# Patient Record
Sex: MALE | Race: WHITE | Employment: OTHER | URBAN - METROPOLITAN AREA
[De-identification: names, ages, dates, MRNs, and addresses within clinical notes are randomized per-mention and may not be internally consistent; named-entity substitution may affect disease eponyms.]

---

## 2018-03-11 ENCOUNTER — APPOINTMENT (OUTPATIENT)
Dept: CT IMAGING | Age: 28
End: 2018-03-11
Payer: MEDICAID

## 2018-03-11 ENCOUNTER — HOSPITAL ENCOUNTER (EMERGENCY)
Age: 28
Discharge: HOME | End: 2018-03-11
Attending: FAMILY MEDICINE
Payer: MEDICAID

## 2018-03-11 VITALS
OXYGEN SATURATION: 99 % | SYSTOLIC BLOOD PRESSURE: 136 MMHG | RESPIRATION RATE: 16 BRPM | WEIGHT: 180 LBS | HEART RATE: 68 BPM | BODY MASS INDEX: 27.28 KG/M2 | TEMPERATURE: 98.1 F | DIASTOLIC BLOOD PRESSURE: 72 MMHG | HEIGHT: 68 IN

## 2018-03-11 DIAGNOSIS — K40.90 UNILATERAL INGUINAL HERNIA WITHOUT OBSTRUCTION OR GANGRENE, RECURRENCE NOT SPECIFIED: Primary | ICD-10-CM

## 2018-03-11 LAB
ALBUMIN SERPL-MCNC: 4.5 G/DL (ref 3.5–5.2)
ALP BLD-CCNC: 53 U/L (ref 40–129)
ALT SERPL-CCNC: 27 U/L (ref 0–40)
ANION GAP SERPL CALCULATED.3IONS-SCNC: 9 MMOL/L (ref 7–16)
AST SERPL-CCNC: 16 U/L (ref 0–39)
BASOPHILS ABSOLUTE: 0.04 E9/L (ref 0–0.2)
BASOPHILS RELATIVE PERCENT: 0.3 % (ref 0–2)
BILIRUB SERPL-MCNC: 0.2 MG/DL (ref 0–1.2)
BILIRUBIN URINE: NEGATIVE
BLOOD, URINE: NEGATIVE
BUN BLDV-MCNC: 8 MG/DL (ref 6–20)
CALCIUM SERPL-MCNC: 9.9 MG/DL (ref 8.6–10.2)
CHLORIDE BLD-SCNC: 103 MMOL/L (ref 98–107)
CLARITY: CLEAR
CO2: 32 MMOL/L (ref 22–29)
COLOR: YELLOW
CREAT SERPL-MCNC: 0.7 MG/DL (ref 0.7–1.2)
EOSINOPHILS ABSOLUTE: 0.17 E9/L (ref 0.05–0.5)
EOSINOPHILS RELATIVE PERCENT: 1.2 % (ref 0–6)
GFR AFRICAN AMERICAN: >60
GFR NON-AFRICAN AMERICAN: >60 ML/MIN/1.73
GLUCOSE BLD-MCNC: 101 MG/DL (ref 74–109)
GLUCOSE URINE: NEGATIVE MG/DL
HCT VFR BLD CALC: 42.5 % (ref 37–54)
HEMOGLOBIN: 14.5 G/DL (ref 12.5–16.5)
IMMATURE GRANULOCYTES #: 0.05 E9/L
IMMATURE GRANULOCYTES %: 0.4 % (ref 0–5)
KETONES, URINE: NEGATIVE MG/DL
LACTIC ACID: 1.4 MMOL/L (ref 0.5–2.2)
LEUKOCYTE ESTERASE, URINE: NEGATIVE
LIPASE: 20 U/L (ref 13–60)
LYMPHOCYTES ABSOLUTE: 3.78 E9/L (ref 1.5–4)
LYMPHOCYTES RELATIVE PERCENT: 26.6 % (ref 20–42)
MCH RBC QN AUTO: 31.7 PG (ref 26–35)
MCHC RBC AUTO-ENTMCNC: 34.1 % (ref 32–34.5)
MCV RBC AUTO: 93 FL (ref 80–99.9)
MONOCYTES ABSOLUTE: 0.89 E9/L (ref 0.1–0.95)
MONOCYTES RELATIVE PERCENT: 6.3 % (ref 2–12)
NEUTROPHILS ABSOLUTE: 9.26 E9/L (ref 1.8–7.3)
NEUTROPHILS RELATIVE PERCENT: 65.2 % (ref 43–80)
NITRITE, URINE: NEGATIVE
PDW BLD-RTO: 12.2 FL (ref 11.5–15)
PH UA: 6 (ref 5–9)
PLATELET # BLD: 301 E9/L (ref 130–450)
PMV BLD AUTO: 9.6 FL (ref 7–12)
POTASSIUM SERPL-SCNC: 4.1 MMOL/L (ref 3.5–5)
PROTEIN UA: NEGATIVE MG/DL
RBC # BLD: 4.57 E12/L (ref 3.8–5.8)
SODIUM BLD-SCNC: 144 MMOL/L (ref 132–146)
SPECIFIC GRAVITY UA: 1.01 (ref 1–1.03)
TOTAL PROTEIN: 7 G/DL (ref 6.4–8.3)
UROBILINOGEN, URINE: 0.2 E.U./DL
WBC # BLD: 14.2 E9/L (ref 4.5–11.5)

## 2018-03-11 PROCEDURE — 81003 URINALYSIS AUTO W/O SCOPE: CPT

## 2018-03-11 PROCEDURE — 85025 COMPLETE CBC W/AUTO DIFF WBC: CPT

## 2018-03-11 PROCEDURE — 6360000004 HC RX CONTRAST MEDICATION: Performed by: RADIOLOGY

## 2018-03-11 PROCEDURE — 80053 COMPREHEN METABOLIC PANEL: CPT

## 2018-03-11 PROCEDURE — 36415 COLL VENOUS BLD VENIPUNCTURE: CPT

## 2018-03-11 PROCEDURE — 6360000002 HC RX W HCPCS: Performed by: PHYSICIAN ASSISTANT

## 2018-03-11 PROCEDURE — 83690 ASSAY OF LIPASE: CPT

## 2018-03-11 PROCEDURE — 99284 EMERGENCY DEPT VISIT MOD MDM: CPT

## 2018-03-11 PROCEDURE — 74177 CT ABD & PELVIS W/CONTRAST: CPT

## 2018-03-11 PROCEDURE — 83605 ASSAY OF LACTIC ACID: CPT

## 2018-03-11 PROCEDURE — 6370000000 HC RX 637 (ALT 250 FOR IP): Performed by: PHYSICIAN ASSISTANT

## 2018-03-11 PROCEDURE — 96374 THER/PROPH/DIAG INJ IV PUSH: CPT

## 2018-03-11 RX ORDER — TRAMADOL HYDROCHLORIDE 50 MG/1
50 TABLET ORAL ONCE
Status: COMPLETED | OUTPATIENT
Start: 2018-03-11 | End: 2018-03-11

## 2018-03-11 RX ORDER — KETOROLAC TROMETHAMINE 30 MG/ML
30 INJECTION, SOLUTION INTRAMUSCULAR; INTRAVENOUS ONCE
Status: COMPLETED | OUTPATIENT
Start: 2018-03-11 | End: 2018-03-11

## 2018-03-11 RX ORDER — TRAMADOL HYDROCHLORIDE 50 MG/1
50 TABLET ORAL EVERY 6 HOURS PRN
Qty: 12 TABLET | Refills: 0 | Status: SHIPPED | OUTPATIENT
Start: 2018-03-11 | End: 2018-03-11

## 2018-03-11 RX ORDER — TRAMADOL HYDROCHLORIDE 50 MG/1
50 TABLET ORAL EVERY 6 HOURS PRN
Qty: 18 TABLET | Refills: 0 | Status: SHIPPED | OUTPATIENT
Start: 2018-03-11 | End: 2018-03-16

## 2018-03-11 RX ADMIN — KETOROLAC TROMETHAMINE 30 MG: 30 INJECTION, SOLUTION INTRAMUSCULAR at 19:17

## 2018-03-11 RX ADMIN — TRAMADOL HYDROCHLORIDE 50 MG: 50 TABLET, FILM COATED ORAL at 21:09

## 2018-03-11 RX ADMIN — IOHEXOL 50 ML: 240 INJECTION, SOLUTION INTRATHECAL; INTRAVASCULAR; INTRAVENOUS; ORAL at 20:43

## 2018-03-11 RX ADMIN — IOPAMIDOL 80 ML: 755 INJECTION, SOLUTION INTRAVENOUS at 20:43

## 2018-03-11 ASSESSMENT — PAIN DESCRIPTION - PAIN TYPE
TYPE: ACUTE PAIN
TYPE: ACUTE PAIN

## 2018-03-11 ASSESSMENT — PAIN SCALES - GENERAL
PAINLEVEL_OUTOF10: 10

## 2018-03-11 ASSESSMENT — PAIN DESCRIPTION - LOCATION
LOCATION: GROIN
LOCATION: PELVIS

## 2018-03-11 ASSESSMENT — PAIN DESCRIPTION - ORIENTATION
ORIENTATION: RIGHT
ORIENTATION: RIGHT

## 2018-03-11 NOTE — LETTER
[address] [facility]  [address]  [place] [address]  Phone: [phone]               March 11, 2018    Patient: Royanne Shock   YOB: 1990   Date of Visit: 3/11/2018       To Whom It May Concern:    Gokul Wesley was seen and treated in our emergency department on 3/11/2018. No lifting greater than 5 lbs until seen by surgery.       Sincerely,       Helder Franklin PA-C         Signature:__________________________________

## 2018-03-12 NOTE — ED PROVIDER NOTES
4. 57 3.80 - 5.80 E12/L    Hemoglobin 14.5 12.5 - 16.5 g/dL    Hematocrit 42.5 37.0 - 54.0 %    MCV 93.0 80.0 - 99.9 fL    MCH 31.7 26.0 - 35.0 pg    MCHC 34.1 32.0 - 34.5 %    RDW 12.2 11.5 - 15.0 fL    Platelets 469 655 - 339 E9/L    MPV 9.6 7.0 - 12.0 fL    Neutrophils % 65.2 43.0 - 80.0 %    Immature Granulocytes % 0.4 0.0 - 5.0 %    Lymphocytes % 26.6 20.0 - 42.0 %    Monocytes % 6.3 2.0 - 12.0 %    Eosinophils % 1.2 0.0 - 6.0 %    Basophils % 0.3 0.0 - 2.0 %    Neutrophils # 9.26 (H) 1.80 - 7.30 E9/L    Immature Granulocytes # 0.05 E9/L    Lymphocytes # 3.78 1.50 - 4.00 E9/L    Monocytes # 0.89 0.10 - 0.95 E9/L    Eosinophils # 0.17 0.05 - 0.50 E9/L    Basophils # 0.04 0.00 - 0.20 E9/L   Comprehensive Metabolic Panel   Result Value Ref Range    Sodium 144 132 - 146 mmol/L    Potassium 4.1 3.5 - 5.0 mmol/L    Chloride 103 98 - 107 mmol/L    CO2 32 (H) 22 - 29 mmol/L    Anion Gap 9 7 - 16 mmol/L    Glucose 101 74 - 109 mg/dL    BUN 8 6 - 20 mg/dL    CREATININE 0.7 0.7 - 1.2 mg/dL    GFR Non-African American >60 >=60 mL/min/1.73    GFR African American >60     Calcium 9.9 8.6 - 10.2 mg/dL    Total Protein 7.0 6.4 - 8.3 g/dL    Alb 4.5 3.5 - 5.2 g/dL    Total Bilirubin 0.2 0.0 - 1.2 mg/dL    Alkaline Phosphatase 53 40 - 129 U/L    ALT 27 0 - 40 U/L    AST 16 0 - 39 U/L   Lactic Acid, Plasma   Result Value Ref Range    Lactic Acid 1.4 0.5 - 2.2 mmol/L   Lipase   Result Value Ref Range    Lipase 20 13 - 60 U/L   Urinalysis   Result Value Ref Range    Color, UA Yellow Straw/Yellow    Clarity, UA Clear Clear    Glucose, Ur Negative Negative mg/dL    Bilirubin Urine Negative Negative    Ketones, Urine Negative Negative mg/dL    Specific Gravity, UA 1.015 1.005 - 1.030    Blood, Urine Negative Negative    pH, UA 6.0 5.0 - 9.0    Protein, UA Negative Negative mg/dL    Urobilinogen, Urine 0.2 <2.0 E.U./dL    Nitrite, Urine Negative Negative    Leukocyte Esterase, Urine Negative Negative       RADIOLOGY:  CT ABDOMEN PELVIS

## 2018-04-04 ASSESSMENT — ENCOUNTER SYMPTOMS: ABDOMINAL PAIN: 1

## 2018-06-14 ENCOUNTER — HOSPITAL ENCOUNTER (OUTPATIENT)
Dept: PSYCHIATRY | Age: 28
Setting detail: THERAPIES SERIES
Discharge: HOME | End: 2018-06-14

## 2018-06-14 VITALS — DIASTOLIC BLOOD PRESSURE: 74 MMHG | SYSTOLIC BLOOD PRESSURE: 124 MMHG | HEART RATE: 85 BPM

## 2018-06-14 PROCEDURE — 80305 DRUG TEST PRSMV DIR OPT OBS: CPT

## 2018-06-14 PROCEDURE — 90791 PSYCH DIAGNOSTIC EVALUATION: CPT

## 2018-06-14 ASSESSMENT — LIFESTYLE VARIABLES: HISTORY_ALCOHOL_USE: YES

## 2018-06-25 ENCOUNTER — HOSPITAL ENCOUNTER (EMERGENCY)
Age: 28
Discharge: HOME | End: 2018-06-26
Attending: EMERGENCY MEDICINE

## 2018-06-25 ENCOUNTER — APPOINTMENT (OUTPATIENT)
Dept: GENERAL RADIOLOGY | Age: 28
End: 2018-06-25

## 2018-06-25 VITALS
DIASTOLIC BLOOD PRESSURE: 87 MMHG | OXYGEN SATURATION: 98 % | HEART RATE: 77 BPM | SYSTOLIC BLOOD PRESSURE: 157 MMHG | RESPIRATION RATE: 18 BRPM | TEMPERATURE: 98.3 F

## 2018-06-25 DIAGNOSIS — S46.911A STRAIN OF RIGHT SHOULDER, INITIAL ENCOUNTER: Primary | ICD-10-CM

## 2018-06-25 DIAGNOSIS — M62.838 SPASM OF MUSCLE: ICD-10-CM

## 2018-06-25 DIAGNOSIS — S39.012A LUMBAR STRAIN, INITIAL ENCOUNTER: ICD-10-CM

## 2018-06-25 PROCEDURE — 99283 EMERGENCY DEPT VISIT LOW MDM: CPT

## 2018-06-25 PROCEDURE — 6370000000 HC RX 637 (ALT 250 FOR IP): Performed by: EMERGENCY MEDICINE

## 2018-06-25 PROCEDURE — 73030 X-RAY EXAM OF SHOULDER: CPT

## 2018-06-25 RX ORDER — CYCLOBENZAPRINE HCL 10 MG
10 TABLET ORAL ONCE
Status: COMPLETED | OUTPATIENT
Start: 2018-06-25 | End: 2018-06-25

## 2018-06-25 RX ORDER — IBUPROFEN 800 MG/1
800 TABLET ORAL ONCE
Status: COMPLETED | OUTPATIENT
Start: 2018-06-25 | End: 2018-06-25

## 2018-06-25 RX ORDER — HYDROCODONE BITARTRATE AND ACETAMINOPHEN 5; 325 MG/1; MG/1
1 TABLET ORAL ONCE
Status: COMPLETED | OUTPATIENT
Start: 2018-06-25 | End: 2018-06-25

## 2018-06-25 RX ADMIN — CYCLOBENZAPRINE HYDROCHLORIDE 10 MG: 10 TABLET, FILM COATED ORAL at 23:45

## 2018-06-25 RX ADMIN — IBUPROFEN 800 MG: 800 TABLET ORAL at 23:45

## 2018-06-25 RX ADMIN — HYDROCODONE BITARTRATE AND ACETAMINOPHEN 1 TABLET: 5; 325 TABLET ORAL at 23:45

## 2018-06-25 ASSESSMENT — PAIN SCALES - GENERAL: PAINLEVEL_OUTOF10: 7

## 2018-06-25 ASSESSMENT — PAIN DESCRIPTION - LOCATION: LOCATION: SHOULDER

## 2018-06-25 ASSESSMENT — PAIN DESCRIPTION - PAIN TYPE: TYPE: ACUTE PAIN

## 2018-06-25 ASSESSMENT — PAIN DESCRIPTION - ORIENTATION: ORIENTATION: RIGHT

## 2018-06-26 ENCOUNTER — APPOINTMENT (OUTPATIENT)
Dept: GENERAL RADIOLOGY | Age: 28
End: 2018-06-26

## 2018-06-26 PROCEDURE — 72100 X-RAY EXAM L-S SPINE 2/3 VWS: CPT

## 2018-06-26 PROCEDURE — 6370000000 HC RX 637 (ALT 250 FOR IP): Performed by: EMERGENCY MEDICINE

## 2018-06-26 RX ORDER — IBUPROFEN 800 MG/1
800 TABLET ORAL EVERY 6 HOURS PRN
Qty: 20 TABLET | Refills: 0 | Status: SHIPPED | OUTPATIENT
Start: 2018-06-26 | End: 2018-07-28

## 2018-06-26 RX ORDER — IBUPROFEN 800 MG/1
800 TABLET ORAL ONCE
Status: COMPLETED | OUTPATIENT
Start: 2018-06-26 | End: 2018-06-26

## 2018-06-26 RX ORDER — CYCLOBENZAPRINE HCL 5 MG
5 TABLET ORAL 3 TIMES DAILY PRN
Qty: 15 TABLET | Refills: 0 | Status: SHIPPED | OUTPATIENT
Start: 2018-06-26 | End: 2018-07-01

## 2018-06-26 RX ADMIN — IBUPROFEN 800 MG: 800 TABLET ORAL at 00:50

## 2018-06-26 ASSESSMENT — PAIN SCALES - GENERAL: PAINLEVEL_OUTOF10: 2

## 2018-06-27 ENCOUNTER — APPOINTMENT (OUTPATIENT)
Dept: GENERAL RADIOLOGY | Age: 28
End: 2018-06-27

## 2018-06-27 ENCOUNTER — HOSPITAL ENCOUNTER (EMERGENCY)
Age: 28
Discharge: HOME | End: 2018-06-27

## 2018-06-27 VITALS
BODY MASS INDEX: 29.19 KG/M2 | OXYGEN SATURATION: 98 % | RESPIRATION RATE: 18 BRPM | DIASTOLIC BLOOD PRESSURE: 74 MMHG | SYSTOLIC BLOOD PRESSURE: 141 MMHG | TEMPERATURE: 98.5 F | HEIGHT: 64 IN | WEIGHT: 171 LBS | HEART RATE: 65 BPM

## 2018-06-27 DIAGNOSIS — M79.5 FOREIGN BODY (FB) IN SOFT TISSUE: Primary | ICD-10-CM

## 2018-06-27 PROCEDURE — 99283 EMERGENCY DEPT VISIT LOW MDM: CPT

## 2018-06-27 PROCEDURE — 73130 X-RAY EXAM OF HAND: CPT

## 2018-06-27 PROCEDURE — 6370000000 HC RX 637 (ALT 250 FOR IP): Performed by: NURSE PRACTITIONER

## 2018-06-27 RX ORDER — CEPHALEXIN 250 MG/1
500 CAPSULE ORAL ONCE
Status: COMPLETED | OUTPATIENT
Start: 2018-06-27 | End: 2018-06-27

## 2018-06-27 RX ORDER — IBUPROFEN 800 MG/1
800 TABLET ORAL ONCE
Status: COMPLETED | OUTPATIENT
Start: 2018-06-27 | End: 2018-06-27

## 2018-06-27 RX ORDER — IBUPROFEN 800 MG/1
TABLET ORAL
Status: DISCONTINUED
Start: 2018-06-27 | End: 2018-06-28 | Stop reason: HOSPADM

## 2018-06-27 RX ORDER — CEPHALEXIN 500 MG/1
500 CAPSULE ORAL 3 TIMES DAILY
Qty: 21 CAPSULE | Refills: 0 | Status: SHIPPED | OUTPATIENT
Start: 2018-06-27 | End: 2018-07-04

## 2018-06-27 RX ADMIN — IBUPROFEN 800 MG: 800 TABLET ORAL at 21:58

## 2018-06-27 RX ADMIN — CEPHALEXIN 500 MG: 250 CAPSULE ORAL at 22:29

## 2018-06-27 ASSESSMENT — PAIN SCALES - GENERAL
PAINLEVEL_OUTOF10: 10
PAINLEVEL_OUTOF10: 10

## 2018-07-13 ENCOUNTER — HOSPITAL ENCOUNTER (OUTPATIENT)
Dept: PSYCHIATRY | Age: 28
Setting detail: THERAPIES SERIES
Discharge: HOME | End: 2018-07-13
Payer: MEDICAID

## 2018-07-13 NOTE — PROGRESS NOTES
This counselor received a call from Avis Dc who was scheduled to begin IOP on this date, 7/1318. Avis Dc explained that he \"works late on Friday\" and wanted to know if he could have an amended schedule of Monday, Tuesday, Wednesday for IOP with a start date of Wednesday. This counselor conferred with lead counselor and it was determined that Avis Dc would be given the opportunity to attended IOP sessions on the dates that he requested in order to accommodate his work schedule. However it was also clearly explained to Avis Dao that he would need to stay vigilant about meeting treatment goals and objectives, regarding his attendance, 12 step participation (ltwo meetings per week) and maintenance of total abstinence. Avis Dc affirmed that he understood he would need to adhere to all aspects of recommended treatment in order for this special schedule to be available to him. Avis Dc is also aware that  will be informed.

## 2018-07-23 ENCOUNTER — HOSPITAL ENCOUNTER (OUTPATIENT)
Dept: PSYCHIATRY | Age: 28
Setting detail: THERAPIES SERIES
Discharge: HOME | End: 2018-07-23
Payer: MEDICAID

## 2018-07-23 PROCEDURE — 99222 1ST HOSP IP/OBS MODERATE 55: CPT | Performed by: FAMILY MEDICINE

## 2018-07-23 PROCEDURE — 80305 DRUG TEST PRSMV DIR OPT OBS: CPT

## 2018-07-23 PROCEDURE — 90853 GROUP PSYCHOTHERAPY: CPT

## 2018-07-23 NOTE — PROGRESS NOTES
This counselor spoke to Wm. Fuller Celsense Kae from [address] [place] [address] who was informed of the conversation that was had with client earlier on this date, with client stating that he would be in group this evening (7/23/18) at 5:30 pm. Anita shared that client has not be very forthcoming with her either, and that he implied that he was coming to group. Officer Olivier Figueroa said she would try to reach out to client to redirect him on the importance of him attending the group sessions as recommended.

## 2018-07-24 ENCOUNTER — HOSPITAL ENCOUNTER (OUTPATIENT)
Dept: PSYCHIATRY | Age: 28
Setting detail: THERAPIES SERIES
Discharge: HOME | End: 2018-07-24
Payer: MEDICAID

## 2018-07-24 PROCEDURE — 90853 GROUP PSYCHOTHERAPY: CPT

## 2018-07-25 ENCOUNTER — HOSPITAL ENCOUNTER (OUTPATIENT)
Dept: PSYCHIATRY | Age: 28
Setting detail: THERAPIES SERIES
Discharge: HOME | End: 2018-07-25
Payer: MEDICAID

## 2018-07-25 PROCEDURE — 90853 GROUP PSYCHOTHERAPY: CPT

## 2018-07-26 NOTE — PROGRESS NOTES
Therapy Note    Date:07/25/2018   Start Time: 5:30 PM   End Time: 8:00 PM  Number of Participants 5    Support Meetings attended:0    Type of Group: IOP  Topic: Daily check in exercise -Discussion on the Pros and Cons of addiction/ Education and activity on identifying relaspe triggers. Patient's Goal:  Maintain abstinence/ Develop sober support / Manage cravings & urges/ Eliminate legal problems from [employer].     Notes: 60 minutes: Counselor facilitated daily check in exercise: Stephen Florian participated in check in exercise and denies having any current cravings or urges to use any substances of abuse or addiction. Counselor notices that client, for the first time, mentions previous use of cocaine. This demonstrates that client is becoming more willing to be honest with himself and his group peers. Client denies having found a sponsor yet but expressed intention to move forward in his recovery by getting to the required number of sober support group meetings. Counselor reminded client that sober support is essential to the recovery process and that support group meeting attendance and finding a sponsor are required for successful completion of this level of care. 60 minutes: Counselor led clients in discussion on the pros and cons of addition. Lila Pool that there really was not any positives related to his using (substances) experiences. He points out that using (substamces) always led him to financial loss and legal trouble. 60 minutes: Counselor provided education and led activity on identifying relapse triggers. Client again states that depression is a relapse trigger for him. Therefore counselor identifies that client will benefit from learning to manage feelings of depression. Urinalysis Submitted Today? No    Urinalysis Results: 0    If Positive, for which substance:0    Date of Urinalysis Results Review: 0     Urinalysis Results Signed by Client?  No    Status After Intervention:
Attentive      Response to Learning: Able to retain information and Capable of insight      Endings: None Reported    Modes of Intervention: Education, Support, Socialization, Exploration, Clarifying, Problem-solving and Activity      Discipline Responsible: [facility]      Signature:  Apollo Lorenzana

## 2018-07-28 ENCOUNTER — HOSPITAL ENCOUNTER (EMERGENCY)
Age: 28
Discharge: HOME | End: 2018-07-28
Attending: EMERGENCY MEDICINE
Payer: MEDICAID

## 2018-07-28 VITALS
HEART RATE: 76 BPM | SYSTOLIC BLOOD PRESSURE: 138 MMHG | DIASTOLIC BLOOD PRESSURE: 70 MMHG | WEIGHT: 180 LBS | BODY MASS INDEX: 27.28 KG/M2 | RESPIRATION RATE: 16 BRPM | OXYGEN SATURATION: 97 % | HEIGHT: 68 IN | TEMPERATURE: 98.2 F

## 2018-07-28 DIAGNOSIS — H11.89 CONJUNCTIVAL IRRITATION: Primary | ICD-10-CM

## 2018-07-28 PROCEDURE — 99283 EMERGENCY DEPT VISIT LOW MDM: CPT

## 2018-07-28 PROCEDURE — 6370000000 HC RX 637 (ALT 250 FOR IP): Performed by: EMERGENCY MEDICINE

## 2018-07-28 RX ORDER — PURIFIED WATER 986 MG/ML
1 SOLUTION OPHTHALMIC ONCE
Status: COMPLETED | OUTPATIENT
Start: 2018-07-28 | End: 2018-07-28

## 2018-07-28 RX ORDER — TOBRAMYCIN 3 MG/ML
1 SOLUTION/ DROPS OPHTHALMIC EVERY 6 HOURS
Qty: 1 BOTTLE | Refills: 0 | Status: SHIPPED | OUTPATIENT
Start: 2018-07-28 | End: 2018-08-07

## 2018-07-28 RX ORDER — TETRACAINE HYDROCHLORIDE 5 MG/ML
2 SOLUTION OPHTHALMIC ONCE
Status: COMPLETED | OUTPATIENT
Start: 2018-07-28 | End: 2018-07-28

## 2018-07-28 RX ADMIN — TETRACAINE HYDROCHLORIDE 2 DROP: 5 SOLUTION OPHTHALMIC at 21:38

## 2018-07-28 RX ADMIN — PURIFIED WATER 1 DROP: 986 SOLUTION OPHTHALMIC at 21:38

## 2018-07-28 RX ADMIN — FLUORESCEIN SODIUM 1 MG: 0.6 STRIP OPHTHALMIC at 21:38

## 2018-07-28 ASSESSMENT — ENCOUNTER SYMPTOMS
EYE INFLAMMATION: 1
VOMITING: 0
BACK PAIN: 0
SORE THROAT: 0
EYE ITCHING: 1
ABDOMINAL PAIN: 0
WHEEZING: 0
EYE REDNESS: 0
COUGH: 0
SHORTNESS OF BREATH: 0
DIARRHEA: 0
EYE DISCHARGE: 0
NAUSEA: 0
SINUS PRESSURE: 0
EYE PAIN: 0

## 2018-07-28 ASSESSMENT — VISUAL ACUITY
OD: 20/10
OU: 20/10
OS: 20/20

## 2018-07-28 NOTE — Clinical Note
Patient left without being seen before triage. Patient was called to triage three times with no response.

## 2018-07-30 ENCOUNTER — HOSPITAL ENCOUNTER (OUTPATIENT)
Dept: PSYCHIATRY | Age: 28
Setting detail: THERAPIES SERIES
Discharge: HOME | End: 2018-07-30
Payer: MEDICAID

## 2018-07-30 PROCEDURE — 80305 DRUG TEST PRSMV DIR OPT OBS: CPT

## 2018-07-30 PROCEDURE — 90853 GROUP PSYCHOTHERAPY: CPT

## 2018-07-31 ENCOUNTER — HOSPITAL ENCOUNTER (OUTPATIENT)
Dept: PSYCHIATRY | Age: 28
Setting detail: THERAPIES SERIES
Discharge: HOME | End: 2018-07-31
Payer: MEDICAID

## 2018-07-31 PROCEDURE — 90853 GROUP PSYCHOTHERAPY: CPT

## 2018-08-01 ENCOUNTER — HOSPITAL ENCOUNTER (OUTPATIENT)
Dept: PSYCHIATRY | Age: 28
Setting detail: THERAPIES SERIES
Discharge: HOME | End: 2018-08-01
Payer: MEDICAID

## 2018-08-01 PROCEDURE — 90853 GROUP PSYCHOTHERAPY: CPT

## 2018-08-02 NOTE — PROGRESS NOTES
Therapy Note    Date:08/1/2018   Start Time: 5:30 PM   End Time: 8:00 PM  Number of Participants 3    Support Meetings attended:0    Type of Group: IOP  Topic: Daily check in exercise - Education and discussion on setting goals. /Activity on overcoming addiction. Patient's Goal:  Maintain abstinence/ Develop sober support / Manage cravings & urges/ Eliminate legal problems from [employer].     Notes: 60 minutes: Counselor facilitated daily check in exercise: Oren Landau participated in check in. Today he shared that he was not having any cravings or urges to use any opiate pain pills or other substances of abuse or addiction. Oren Landau shared that he was in a pretty positive mood right now. He also verbalized intention to attend additional sober support group meetings. Additionally he stated that the support of others is very helpful to him. 60 minutes: Counselor provided education and led discussion on setting goals in recovery. Oren Landau shared that he recognized that it is important to make goals that are attainable and realistic. 60 minutes: Counselor led clients in activity aimed at overcoming addiction by using recovery tools. Oren Landau participated in activity and shared that taking things a day at a time is the best way to overcome addictions. Urinalysis Submitted Today? No    Urinalysis Results: 0    If Positive, for which substance:0    Date of Urinalysis Results Review: 0     Urinalysis Results Signed by Client? No    Status After Intervention:  Improved    Participation Level:  Active Listener and Interactive    Participation Quality: Appropriate, Attentive and Sharing      Speech:  normal      Thought Process/Content: Logical      Level of consciousness:  Alert, Oriented x4 and Attentive      Response to Learning: Able to retain information and Capable of insight      Endings: None Reported    Modes of Intervention: Education, Support, Socialization, Problem-solving and Activity      Discipline Responsible:

## 2018-08-02 NOTE — PROGRESS NOTES
Therapy Note    Date:07/31/2018   Start Time: 5:30 PM   End Time: 8:00 PM  Number of Participants 4    Support Meetings attended:0    Type of Group: IOP  Topic: Daily check in exercise - Education on knowing when to say no/Activity on learning to apply the principals of recovery. Patient's Goal:  Maintain abstinence/ Develop sober support / Manage cravings & urges/ Eliminate legal problems from [employer].     Notes: 60 minutes: Counselor facilitated daily check in exercise: Tea Reagan participated in daily check in exercise and shared that he has cravings and urges to use opiate pain killers, but is using talking to people who are positive as a sober support coping skill. Tea Reagan denies having a sponsor yet but verbalized intention of having one \"by next week\". Tea Reagan additionally shared that he was glad to be in IOP and is finding it helpful to talk about addiction and recovery. 60 minutes: Counselor showed educational film omn knowing when to say no. Tea Reagan was attuned to information being presented and engaged in the group discussion about the material presented in the film. Tea Reagan shared that learning to walk away from risky situations is important. He verbalized the importance of retreating and thinking about making choices before responding. 60 minutes: Counselor led clients in activity aimed at learning to apply the principles of recovery. Tea Reagan shared that being honest about what addiction has done to his life is a good way to learn to stay sober. He reflected on his own choices that caused him to use substances and also caused negative consequences. Urinalysis Submitted Today? No    Urinalysis Results: 0    If Positive, for which substance:0    Date of Urinalysis Results Review: 0     Urinalysis Results Signed by Client? No    Status After Intervention:  Improved    Participation Level:  Active Listener and Interactive    Participation Quality: Appropriate, Attentive and Sharing      Speech:

## 2018-08-06 ENCOUNTER — HOSPITAL ENCOUNTER (OUTPATIENT)
Dept: PSYCHIATRY | Age: 28
Setting detail: THERAPIES SERIES
Discharge: HOME | End: 2018-08-06
Payer: MEDICAID

## 2018-08-06 PROCEDURE — 90853 GROUP PSYCHOTHERAPY: CPT

## 2018-08-06 PROCEDURE — 90834 PSYTX W PT 45 MINUTES: CPT

## 2018-08-07 ENCOUNTER — HOSPITAL ENCOUNTER (OUTPATIENT)
Dept: PSYCHIATRY | Age: 28
Setting detail: THERAPIES SERIES
Discharge: HOME | End: 2018-08-07
Payer: MEDICAID

## 2018-08-07 PROCEDURE — 90853 GROUP PSYCHOTHERAPY: CPT

## 2018-08-07 PROCEDURE — 90834 PSYTX W PT 45 MINUTES: CPT

## 2018-08-07 PROCEDURE — 80305 DRUG TEST PRSMV DIR OPT OBS: CPT

## 2018-08-08 ENCOUNTER — HOSPITAL ENCOUNTER (EMERGENCY)
Age: 28
Discharge: HOME | End: 2018-08-08
Payer: MEDICAID

## 2018-08-08 ENCOUNTER — HOSPITAL ENCOUNTER (OUTPATIENT)
Dept: PSYCHIATRY | Age: 28
Setting detail: THERAPIES SERIES
Discharge: HOME | End: 2018-08-08
Payer: MEDICAID

## 2018-08-08 VITALS
WEIGHT: 180 LBS | DIASTOLIC BLOOD PRESSURE: 63 MMHG | BODY MASS INDEX: 27.28 KG/M2 | HEART RATE: 54 BPM | OXYGEN SATURATION: 99 % | TEMPERATURE: 98.2 F | SYSTOLIC BLOOD PRESSURE: 145 MMHG | HEIGHT: 68 IN | RESPIRATION RATE: 18 BRPM

## 2018-08-08 DIAGNOSIS — B35.3 TINEA PEDIS OF LEFT FOOT: Primary | ICD-10-CM

## 2018-08-08 DIAGNOSIS — L08.9 BLISTER OF TOE OF LEFT FOOT WITH INFECTION, INITIAL ENCOUNTER: ICD-10-CM

## 2018-08-08 DIAGNOSIS — S90.425A BLISTER OF TOE OF LEFT FOOT WITH INFECTION, INITIAL ENCOUNTER: ICD-10-CM

## 2018-08-08 PROCEDURE — 90834 PSYTX W PT 45 MINUTES: CPT

## 2018-08-08 PROCEDURE — 99282 EMERGENCY DEPT VISIT SF MDM: CPT

## 2018-08-08 PROCEDURE — 80305 DRUG TEST PRSMV DIR OPT OBS: CPT

## 2018-08-08 PROCEDURE — 90853 GROUP PSYCHOTHERAPY: CPT

## 2018-08-08 RX ORDER — CEPHALEXIN 500 MG/1
500 CAPSULE ORAL 3 TIMES DAILY
Qty: 21 CAPSULE | Refills: 0 | Status: SHIPPED | OUTPATIENT
Start: 2018-08-08 | End: 2018-08-18

## 2018-08-08 ASSESSMENT — PAIN DESCRIPTION - ORIENTATION: ORIENTATION: LEFT

## 2018-08-08 ASSESSMENT — PAIN DESCRIPTION - LOCATION: LOCATION: TOE (COMMENT WHICH ONE)

## 2018-08-08 ASSESSMENT — PAIN DESCRIPTION - FREQUENCY: FREQUENCY: CONTINUOUS

## 2018-08-08 ASSESSMENT — PAIN DESCRIPTION - DESCRIPTORS: DESCRIPTORS: THROBBING

## 2018-08-08 ASSESSMENT — PAIN SCALES - GENERAL: PAINLEVEL_OUTOF10: 8

## 2018-08-09 NOTE — PLAN OF CARE
Problem: Substance Abuse:  Goal: Patient specific goal  Patient specific goal  Case Management Goal #2:  Aminata Hoyos will develop an understanding of the relationship between his mental health issues and addictive behavior. 1. Aminata Hoyos will schedule an appointment with [facility] by, August 17th, 2018. Aminata Hoyos will also provide counselor with verification of having scheduled appointment and make sure to keep that initial appointment and all other scheduled appointments. 2.  Aminata Hoyos will list 5-7 negative consequences resulting from attempts to cope with mental health problems through using opiates, cocaine, or any other substance of abuse or addiction, without seeking professional help. Objective should be completed by September 10th, 2018. 3. Aminata Hoyos will explore and share in group therapy, 3-5 positive, safe, and sober activities that have helped him with managing feelings of depression or anxiety.  Aminata Hoyos will also ask for feedback from the group in order to add to his list. Objective should be completed by October 7, 2018.  4.   5.

## 2018-08-09 NOTE — ED PROVIDER NOTES
-------------------------------------------------  All laboratory and radiology results have been personally reviewed by myself   LABS:  No results found for this visit on 08/08/18. RADIOLOGY:  Interpreted by Radiologist.  No orders to display       ------------------------- NURSING NOTES AND VITALS REVIEWED ---------------------------   The nursing notes within the ED encounter and vital signs as below have been reviewed. BP (!) 145/63   Pulse 54   Temp 98.2 °F (36.8 °C) (Oral)   Resp 18   Ht 5' 8\" (1.727 m)   Wt 180 lb (81.6 kg)   SpO2 99%   BMI 27.37 kg/m²   Oxygen Saturation Interpretation: Normal      ---------------------------------------------------PHYSICAL EXAM--------------------------------------      Constitutional/General: Alert and oriented x3, well appearing, non toxic in NAD  Head: NC/AT  Eyes: PERRL, EOMI  Mouth: Oropharynx clear, handling secretions, no trismus  Neck: Supple, full ROM, no meningeal signs  Pulmonary: Lungs clear to auscultation bilaterally, no wheezes, rales, or rhonchi. Not in respiratory distress  Cardiovascular:  Regular rate and rhythm, no murmurs, gallops, or rubs. 2+ distal pulses  Extremities: Moves all extremities x 4. Pt has macerated rash between 3/4 toes. There is small redness on dorsum of left 4th toe. No drainage or abscess. Tender mildly. Pulses and ROM intact. Warm and well perfused  Skin: warm and dry without rash  Neurologic: GCS 15,  Intact. No focal deficits  Psych: Normal Affect      ------------------------------ ED COURSE/MEDICAL DECISION MAKING----------------------  Medications - No data to display      Medical Decision Making:    Pt has tinea pedis with very small area on dorsum of toe that appears to have early cellulitis. Plan discharge home with topical antifungal and PO abs. Discussed need for close follow-up, especially if worse. Counseling:    The emergency provider has spoken with the patient and discussed todays results, in addition to providing specific details for the plan of care and counseling regarding the diagnosis and prognosis. Questions are answered at this time and they are agreeable with the plan.      --------------------------------- IMPRESSION AND DISPOSITION ---------------------------------    IMPRESSION  1. Tinea pedis of left foot    2.  Blister of toe of left foot with infection, initial encounter        DISPOSITION  Disposition: Discharge to home  Patient condition is stable                   Todd Og PA-C  08/08/18 [id number]

## 2018-08-09 NOTE — PLAN OF CARE
Problem: Substance Abuse:  Goal: Patient specific goal  Patient specific goal   Case Management Goal #2:  Geoffrey Doran will develop an understanding of the relationship between his mental health issues and addictive behavior. 1. Geoffrey Doran will schedule an appointment with [facility]inger [facility] by, August 17th, 2018. Geoffrey Doran will also provide counselor with verification of having scheduled appointment and make sure to keep that initial appointment and all other scheduled appointments. 2.  Geoffrey Doran will list 5-7 negative consequences resulting from attempts to cope with mental health problems through using opiates, cocaine, or any other substance of abuse or addiction, without seeking professional help. Objective should be completed by September 10th, 2018. 3. Geoffrey Doran will explore and share in group therapy, 3-5 positive, safe, and sober activities that have helped him with managing feelings of depression or anxiety.  Geoffrey Doran will also ask for feedback from the group in order to add to his list. Objective should be completed by October 10th, 2018.  4.   5.

## 2018-08-10 NOTE — PLAN OF CARE
Problem: Substance Abuse:  Goal: Patient specific goal  Patient specific goal    Case Management Goal #3: Client will develop an understanding of the relationship between maintaining his medically assisted treatment and his commitment to sobriety, treatment and recovery over the next 18-24 sessions of IOP, 12 sessions of aftercare and upon discharge. 1. Client will continue to keep all medical appointments at [facility] and follow through with all recommendations made by physician and medical professionals, throughout the next 18-24 sessions of IOP, 12 sessions of aftercare and upon discharge. 2. Client will list 3-5 negative consequences which could result from not taking medications as prescribed or adhering to all recommendations made by physician and medical professionals over the next 18-24 sessions of IOP, 12 sessions of aftercare and upon discharge. Objective to be completed by October 10th, 2018. 3. Client will inform Intensive Outpatient counselor of any changes to medical treatment, such as any new prescriptions given or changes to current dosages. Client will also sign a release of information necessary for obtaining all collateral data needed per program policy over the next 17-68 sessions of IOP, 12 sessions of aftercare and upon discharge.

## 2018-08-10 NOTE — PROGRESS NOTES
significant other indicates that Megan Hays will benefit from addressing co-dependency which will be a new treatment plan objective for this client. Urinalysis Submitted Today? Yes    Urinalysis Results: 0    If Positive, for which substance:0    Date of Urinalysis Results Review: 0     Urinalysis Results Signed by Client? No    Status After Intervention:  Unchanged    Participation Level:  Active Listener and Interactive    Participation Quality: Appropriate, Attentive and Sharing      Speech:  normal      Thought Process/Content: Logical      Level of consciousness:  Alert, Oriented x4 and Attentive      Response to Learning: Able to retain information and Capable of insight      Endings: None Reported    Modes of Intervention: Education, Support, Socialization, Exploration, Clarifying, Problem-solving and Activity      Discipline Responsible: [facility]      Signature:  Apollo Strickland

## 2018-08-13 ENCOUNTER — HOSPITAL ENCOUNTER (OUTPATIENT)
Dept: PSYCHIATRY | Age: 28
Setting detail: THERAPIES SERIES
Discharge: HOME | End: 2018-08-13
Payer: MEDICAID

## 2018-08-13 PROCEDURE — 90834 PSYTX W PT 45 MINUTES: CPT

## 2018-08-13 PROCEDURE — 80305 DRUG TEST PRSMV DIR OPT OBS: CPT

## 2018-08-13 PROCEDURE — 90853 GROUP PSYCHOTHERAPY: CPT

## 2018-08-13 NOTE — PLAN OF CARE
Problem: Substance Abuse:  Goal: Patient specific goal  Patient specific goal  Goal:4  Over the remaining 14 sessions of intensive outpatient treatment, 12 session of aftercare and upon discharge, Ora Lam will learn about co-dependent behavior as it pertains to his intimate partner relationship, explore how co-dependency can interfere with his personal recovery and identify ways of setting appropriate boundaries to overcome co-dependent behaviors and enhance recovery outcomes. 1.  Read assigned material on healthy versus unhealthy relationships and share in group 3-5 insights that you can relate to as to how being in an unhealthy relationship can affect your recovery. Objective should be met by October 13. 2018. 2. Identify 5-7 barriers that keep you from developing a gender specific support system and also identify 3-5 ways you can overcome those barriers and increase your gender specific sober support. Share in group and ask for feedback from peers. Objective should be met by October 13. 2018. 3. Define the term Boundaries, identify what type of boundaries you have with your partner and devise a plan to practice 5-7 ways of setting necessary boundaries with your partner, that have the potential to enhance your treatment and recovery outcomes. Share in group and ask for feedback from counselor and peers. Objective should be met by October 13. 2018.

## 2018-08-13 NOTE — PROGRESS NOTES
Group Therapy Note    Date: 2018   Start Time: 5:30PM   End Time: 8:00PM  Number of Participants: 3  Support Meetings attended:0    Type of Group: ***     Topic: ***    Patient's Goal:  Maintain abstinence/ Develop sober support / Manage cravings & urges/ Eliminate legal problems from [facility]. Notes: 61: Counselor facilitated daily check in exercise: Client reported continued sobriety and denies having any cravings & urges to use substances. Client attended (2) meetings and reports actively seeking to increase sober supports and gain sponsorship. Client did not report obtaining any new supports , or gaining sponsorship today. 60:Counselor focused on the disease concept of addiction and client was able to identify with the progression of addiction by sharing how he used alcohol, and progressed to pain pills. Urinalysis Submitted Today? {YES/NO:}    Urinalysis Results: {gen negative/positive:386339}    If Positive, for which substance:***    Date of Urinalysis Results Review: ***     Urinalysis Results Signed by Client?  {YES/NO:}    Status After Intervention:  {Status After Intervention:569145003}    Participation Level: {Participation Level:213420076}    Participation Quality: {[facility] PARTICIPATION QUALITY:969975158}      Speech:  normal      Thought Process/Content: {Thought Process/Content:020953001}      Affective Functioning: {Affective Functionin}      Mood: {Mood:695258786}      Level of consciousness:  {Level of consciousness:394292657}      Response to Learnin [facility] Responses to Learnin}      Endings: {[facility] Endings:12767}    Modes of Intervention: {[facility] Modes of Intervention:736856178}      Discipline Responsible: {[facility] Multidisciplinary:677098684}      Signature:  Carrillo George, Apollo Benjamin

## 2018-08-14 ENCOUNTER — HOSPITAL ENCOUNTER (OUTPATIENT)
Dept: PSYCHIATRY | Age: 28
Setting detail: THERAPIES SERIES
Discharge: HOME | End: 2018-08-14
Payer: MEDICAID

## 2018-08-14 PROCEDURE — 90853 GROUP PSYCHOTHERAPY: CPT

## 2018-08-14 PROCEDURE — 90834 PSYTX W PT 45 MINUTES: CPT

## 2018-08-15 ENCOUNTER — HOSPITAL ENCOUNTER (OUTPATIENT)
Dept: PSYCHIATRY | Age: 28
Setting detail: THERAPIES SERIES
Discharge: HOME | End: 2018-08-15
Payer: MEDICAID

## 2018-08-15 PROCEDURE — 90834 PSYTX W PT 45 MINUTES: CPT

## 2018-08-15 PROCEDURE — 90853 GROUP PSYCHOTHERAPY: CPT

## 2018-08-15 NOTE — PROGRESS NOTES
Therapy Note    Date:08/13/2018   Start Time: 5:30 PM   End Time: 8:00 PM  Number of Participants 3    Support Meetings attended:2    Type of Group: IOP    Topic: Daily check in exercise - Education on powerlessness/ Discussion on dealing with difficulties in recovery. Patient's Goal:  Maintain abstinence/ Develop sober support / Manage cravings & urges/ Eliminate legal problems from [facility].     Notes: 60 minutes: Counselor facilitated daily check in exercise: Gael Du participated in daily check in exercise and shared that he did not currently have any cravings to use opiates or cocaine. In addition to going to two sober support group meetings, for which Gael Du provided documentation, he also provided documentation confirming that he had obtained a sponsor with whom he will maintain regular contact. Counselor and client went over new treatment plan and case management objectives. Gael Du affirmed that he is adhering to the protocol of the MAT program by taking his medication as directed and abstaining from the use of any other mood or mind altering substances. Additionally Gael Du shared that he realized that not adhering to the medication regimen could cause him to be at risk for overdose and death. Gael Du also shared that he was less irritable and anxious now that he is on his medication. 60 minutes: Counselor provided education on powerlessness. Gael Du was attentive and shared that he realized he is powerless over his addiction and other people. 60 minutes: Counselor lead clients in discussion on dealing with difficult people and situations in recovery without using substances. Gael Du offered insights about dealing with difficulties by sharing the importance of having sober support and someone to talk to.  Counselor reminded Gael Du that he would benefit from expanding his sober support network to include other men in recovery and that by working on the new objective of treatment he should be able to overcome tendency to rely so much on intimate and also overcome limitations in building a stronger sober support network of other males in recovery. [place] affirmed that he would work on it. Urinalysis Submitted Today? Yes    Urinalysis Results: 0    If Positive, for which substance:0    Date of Urinalysis Results Review: 0     Urinalysis Results Signed by Client? No    Status After Intervention:  Improved    Participation Level:  Active Listener and Interactive    Participation Quality: Appropriate, Attentive, Sharing and Lethargic      Speech:  normal      Thought Process/Content: Logical      Level of consciousness:  Oriented x4, Attentive and Drowsy      Response to Learning: Able to retain information and Capable of insight      Endings: None Reported    Modes of Intervention: Education, Support, Socialization, Exploration, Clarifying and Problem-solving      Discipline Responsible: [facility]      Signature:  Apollo Lorenzana

## 2018-08-17 ENCOUNTER — HOSPITAL ENCOUNTER (OUTPATIENT)
Dept: PSYCHIATRY | Age: 28
Setting detail: THERAPIES SERIES
Discharge: HOME | End: 2018-08-17
Payer: MEDICAID

## 2018-08-17 PROCEDURE — 90834 PSYTX W PT 45 MINUTES: CPT

## 2018-08-17 PROCEDURE — 90853 GROUP PSYCHOTHERAPY: CPT

## 2018-08-17 NOTE — PROGRESS NOTES
well being.      Urinalysis Submitted Today? No     Urinalysis Results: negative     If Positive, for which substance:)     Date of Urinalysis Results Review: 08/08/18      Urinalysis Results Signed by Client? Yes     Status After Intervention:  Improved     Participation Level:  Active Listener and Interactive     Participation Quality: Appropriate, Attentive and Sharing        Speech:  normal        Thought Process/Content: Logical        Level of consciousness:  Alert, Oriented x4 and Attentive        Response to Learning: Able to verbalize/acknowledge new learning, Able to retain information and Capable of insight        Endings: None Reported     Modes of Intervention: Education, Support, Socialization, Exploration, Problem-solving and Activity        Discipline Responsible: [facility]        Signature:  Apollo Paige

## 2018-08-20 ENCOUNTER — HOSPITAL ENCOUNTER (OUTPATIENT)
Dept: PSYCHIATRY | Age: 28
Setting detail: THERAPIES SERIES
Discharge: HOME | End: 2018-08-20
Payer: MEDICAID

## 2018-08-20 PROCEDURE — 90853 GROUP PSYCHOTHERAPY: CPT

## 2018-08-20 PROCEDURE — 90834 PSYTX W PT 45 MINUTES: CPT

## 2018-08-21 ENCOUNTER — HOSPITAL ENCOUNTER (OUTPATIENT)
Dept: PSYCHIATRY | Age: 28
Setting detail: THERAPIES SERIES
Discharge: HOME | End: 2018-08-21
Payer: MEDICAID

## 2018-08-21 PROCEDURE — 90834 PSYTX W PT 45 MINUTES: CPT

## 2018-08-21 PROCEDURE — 80305 DRUG TEST PRSMV DIR OPT OBS: CPT

## 2018-08-21 PROCEDURE — 90853 GROUP PSYCHOTHERAPY: CPT

## 2018-08-21 NOTE — PROGRESS NOTES
Therapy Note    Date:08/17/2018   Start Time: 5:30 PM   End Time: 8:00 PM  Number of Participants 5    Support Meetings attended:0    Type of Group: IOP  Topic: Daily check in exercise - Discussion on Acceptance/ Object Lesson on Keeping Recovery First.      Patient's Goal:  Maintain abstinence/ Develop sober support / Manage cravings & urges/ Eliminate legal problems from [employer].     Notes: 60 minutes: Counselor facilitated daily check in exercise: Kinsey Tejada denied having any cravings or urges to use opiates or cocaine. Kinsey Tejada also shared that he would be attending sober support group meetings over the weekend and has a plan to meet with his sponsor for the first time on the upcoming Saturday. Kinsey Tejada expressed that the initial meeting with his sponsor will allow him to receive instructions for beginning to work on the 12 steps with his sponsor, beginning with step one. 60 minutes: Counselor lead clients in discussion on the recovery principle of Acceptance. Kinsey Tejada talked about the difference between acceptance of being an addict and admitting that he is an addict. Kinsey Tejada shared \"when you accept you are an addict it means you accept that it is time to do something about it. \".   60 minutes: Counselor facilitated group activity aimed at enhancing client's awareness of the importance of keeping recovery first.  Kinsey Tejada was attuned and actively involved in the group project. He shared that keeping recovery first, by attending meetings and working with a sponsor,  has been helpful to him. Urinalysis Submitted Today? No    Urinalysis Results: 0    If Positive, for which substance:0    Date of Urinalysis Results Review: 0     Urinalysis Results Signed by Client?  No    Status After Intervention:  Improved    Participation Level: Interactive    Participation Quality: Attentive and Sharing      Speech:  normal      Thought Process/Content: Logical      Level of consciousness:  Alert and Oriented x4      Response to Learning:

## 2018-08-22 ENCOUNTER — HOSPITAL ENCOUNTER (OUTPATIENT)
Dept: PSYCHIATRY | Age: 28
Setting detail: THERAPIES SERIES
Discharge: HOME | End: 2018-08-22
Payer: MEDICAID

## 2018-08-22 PROCEDURE — 90834 PSYTX W PT 45 MINUTES: CPT

## 2018-08-22 PROCEDURE — 90853 GROUP PSYCHOTHERAPY: CPT

## 2018-08-22 NOTE — PROGRESS NOTES
Therapy Note    Date:08/20/2018   Start Time: 5:30 PM   End Time: 8:00 PM  Number of Participants 8    Support Meetings attended:2    Type of Group: IOP  Topic: Daily check in exercise - Discussion on the Importance of Self-Care/ Education on Making Amends. Patient's Goal:  Maintain abstinence/ Develop sober support / Manage cravings & urges/ Eliminate legal problems from [facility].     Notes: 60 minutes: Counselor facilitated daily check in exercise: Harshad Combs participated in daily check in by sharing that he had not had any cravings or urges to use opiates or cocaine over the weekend. He shared that he does have a sponsor and that he uses sober support as a coping skill. He also shared that he had attended two sober support group meetings over the weekend and provided a copy of his meeting attendance sheet for counselor to review. 60 minutes: Counselor lead clients in discussion on the importance of practicing self care to enhance recovery. Harshad Combs shared that being actively involved in his substance use disorder recovery is a major component of his self care. He also shared about learning to make decisions of who he spent his \"time hanging around with\" is a part of self care. 60 minutes: Counselor provided education on the act of making amends. Harshad Combs expressed that he was interested in making amends and that he can see that \"the benefit of making amends is for us rather than the other person\". Counselor affirmed Chavo's observation by sharing that making amends is like clearing our own side of the street. Harshad Combs agreed with this statement. Urinalysis Submitted Today? No    Urinalysis Results: 0    If Positive, for which substance:0    Date of Urinalysis Results Review: 0     Urinalysis Results Signed by Client? No    Status After Intervention:  Improved    Participation Level:  Active Listener and Interactive    Participation Quality: Attentive, Sharing and Intrusive      Speech:  normal      Thought

## 2018-08-24 ENCOUNTER — HOSPITAL ENCOUNTER (OUTPATIENT)
Dept: PSYCHIATRY | Age: 28
Setting detail: THERAPIES SERIES
Discharge: HOME | End: 2018-08-24
Payer: MEDICAID

## 2018-08-24 PROCEDURE — 90853 GROUP PSYCHOTHERAPY: CPT

## 2018-08-24 PROCEDURE — 90834 PSYTX W PT 45 MINUTES: CPT

## 2018-08-27 ENCOUNTER — HOSPITAL ENCOUNTER (OUTPATIENT)
Dept: PSYCHIATRY | Age: 28
Setting detail: THERAPIES SERIES
Discharge: HOME | End: 2018-08-27
Payer: MEDICAID

## 2018-08-27 PROCEDURE — 90853 GROUP PSYCHOTHERAPY: CPT

## 2018-08-27 PROCEDURE — 90834 PSYTX W PT 45 MINUTES: CPT

## 2018-08-28 ENCOUNTER — HOSPITAL ENCOUNTER (EMERGENCY)
Age: 28
Discharge: HOME | End: 2018-08-28
Attending: EMERGENCY MEDICINE
Payer: MEDICAID

## 2018-08-28 ENCOUNTER — HOSPITAL ENCOUNTER (OUTPATIENT)
Dept: PSYCHIATRY | Age: 28
Setting detail: THERAPIES SERIES
Discharge: HOME | End: 2018-08-28
Payer: MEDICAID

## 2018-08-28 VITALS
HEART RATE: 80 BPM | SYSTOLIC BLOOD PRESSURE: 123 MMHG | WEIGHT: 180 LBS | DIASTOLIC BLOOD PRESSURE: 61 MMHG | OXYGEN SATURATION: 98 % | BODY MASS INDEX: 27.37 KG/M2 | TEMPERATURE: 97.8 F | RESPIRATION RATE: 16 BRPM

## 2018-08-28 DIAGNOSIS — L23.5 ALLERGIC DERMATITIS DUE TO OTHER CHEMICAL PRODUCT: Primary | ICD-10-CM

## 2018-08-28 PROCEDURE — 90853 GROUP PSYCHOTHERAPY: CPT

## 2018-08-28 PROCEDURE — G0381 LEV 2 HOSP TYPE B ED VISIT: HCPCS

## 2018-08-28 PROCEDURE — 90834 PSYTX W PT 45 MINUTES: CPT

## 2018-08-28 PROCEDURE — 99282 EMERGENCY DEPT VISIT SF MDM: CPT

## 2018-08-28 RX ORDER — BUPRENORPHINE AND NALOXONE 8; 2 MG/1; MG/1
2 FILM, SOLUBLE BUCCAL; SUBLINGUAL DAILY
Status: ON HOLD | COMMUNITY
End: 2021-07-19 | Stop reason: HOSPADM

## 2018-08-28 RX ORDER — METHYLPREDNISOLONE 4 MG/1
TABLET ORAL
Qty: 1 KIT | Refills: 0 | Status: SHIPPED | OUTPATIENT
Start: 2018-08-28 | End: 2018-08-31

## 2018-08-28 RX ORDER — DIPHENHYDRAMINE HCL 25 MG
25 CAPSULE ORAL EVERY 6 HOURS PRN
Qty: 12 CAPSULE | Refills: 0 | Status: SHIPPED | OUTPATIENT
Start: 2018-08-28 | End: 2018-08-31

## 2018-08-28 ASSESSMENT — ENCOUNTER SYMPTOMS
RESPIRATORY NEGATIVE: 1
GASTROINTESTINAL NEGATIVE: 1
EYES NEGATIVE: 1

## 2018-08-29 ENCOUNTER — HOSPITAL ENCOUNTER (OUTPATIENT)
Dept: PSYCHIATRY | Age: 28
Setting detail: THERAPIES SERIES
Discharge: HOME | End: 2018-08-29
Payer: MEDICAID

## 2018-08-29 PROCEDURE — 90834 PSYTX W PT 45 MINUTES: CPT

## 2018-08-29 PROCEDURE — 90853 GROUP PSYCHOTHERAPY: CPT

## 2018-08-29 PROCEDURE — 80305 DRUG TEST PRSMV DIR OPT OBS: CPT

## 2018-08-31 ENCOUNTER — HOSPITAL ENCOUNTER (EMERGENCY)
Age: 28
Discharge: HOME | End: 2018-08-31
Attending: EMERGENCY MEDICINE
Payer: MEDICAID

## 2018-08-31 ENCOUNTER — HOSPITAL ENCOUNTER (OUTPATIENT)
Dept: PSYCHIATRY | Age: 28
Setting detail: THERAPIES SERIES
Discharge: HOME | End: 2018-08-31
Payer: MEDICAID

## 2018-08-31 VITALS
SYSTOLIC BLOOD PRESSURE: 130 MMHG | OXYGEN SATURATION: 98 % | HEART RATE: 58 BPM | RESPIRATION RATE: 14 BRPM | TEMPERATURE: 97.9 F | DIASTOLIC BLOOD PRESSURE: 77 MMHG | WEIGHT: 181 LBS | BODY MASS INDEX: 27.52 KG/M2

## 2018-08-31 DIAGNOSIS — T15.91XA FOREIGN BODY OF RIGHT EXTERNAL EYE, INITIAL ENCOUNTER: Primary | ICD-10-CM

## 2018-08-31 PROCEDURE — G0381 LEV 2 HOSP TYPE B ED VISIT: HCPCS

## 2018-08-31 PROCEDURE — 90853 GROUP PSYCHOTHERAPY: CPT

## 2018-08-31 PROCEDURE — 99282 EMERGENCY DEPT VISIT SF MDM: CPT

## 2018-08-31 PROCEDURE — 6370000000 HC RX 637 (ALT 250 FOR IP)

## 2018-08-31 RX ORDER — TOBRAMYCIN 3 MG/ML
1 SOLUTION/ DROPS OPHTHALMIC EVERY 6 HOURS
Qty: 1 BOTTLE | Refills: 0 | Status: SHIPPED | OUTPATIENT
Start: 2018-08-31 | End: 2018-09-10

## 2018-08-31 RX ORDER — PURIFIED WATER 986 MG/ML
SOLUTION OPHTHALMIC
Status: COMPLETED
Start: 2018-08-31 | End: 2018-08-31

## 2018-08-31 RX ORDER — PROPARACAINE HYDROCHLORIDE 5 MG/ML
SOLUTION/ DROPS OPHTHALMIC
Status: COMPLETED
Start: 2018-08-31 | End: 2018-08-31

## 2018-08-31 RX ADMIN — FLUORESCEIN SODIUM: 1 STRIP OPHTHALMIC at 18:46

## 2018-08-31 RX ADMIN — PURIFIED WATER: 986 SOLUTION OPHTHALMIC at 18:47

## 2018-08-31 RX ADMIN — PROPARACAINE HYDROCHLORIDE: 5 SOLUTION/ DROPS OPHTHALMIC at 18:23

## 2018-08-31 ASSESSMENT — ENCOUNTER SYMPTOMS
ABDOMINAL PAIN: 0
EYE DISCHARGE: 0
SINUS PRESSURE: 0
WHEEZING: 0
COUGH: 0
BACK PAIN: 0
EYE REDNESS: 0
SORE THROAT: 0
VOMITING: 0
DIARRHEA: 0
EYE PAIN: 0
SHORTNESS OF BREATH: 0
NAUSEA: 0

## 2018-08-31 ASSESSMENT — PAIN DESCRIPTION - FREQUENCY: FREQUENCY: CONTINUOUS

## 2018-08-31 ASSESSMENT — PAIN DESCRIPTION - DESCRIPTORS: DESCRIPTORS: BURNING

## 2018-08-31 ASSESSMENT — PAIN DESCRIPTION - LOCATION: LOCATION: EYE

## 2018-08-31 ASSESSMENT — PAIN DESCRIPTION - PAIN TYPE: TYPE: ACUTE PAIN

## 2018-08-31 ASSESSMENT — PAIN DESCRIPTION - PROGRESSION: CLINICAL_PROGRESSION: NOT CHANGED

## 2018-08-31 ASSESSMENT — PAIN DESCRIPTION - ORIENTATION: ORIENTATION: RIGHT

## 2018-08-31 ASSESSMENT — PAIN SCALES - GENERAL: PAINLEVEL_OUTOF10: 5

## 2018-08-31 NOTE — LETTER
[facility] [address] [place] [address] Emergency Department  [address] [place] [address]  [address] [id number]  Phone: [phone]               August 31, 2018    Patient: Johanny Tejada   YOB: 1990   Date of Visit: 8/31/2018       To Whom It May Concern:    Sharyn Jeong was seen and treated in our emergency department on 8/31/2018. He may return to school tonight.      Sincerely,       Rosanna Ballesteros RN         Signature:__________________________________

## 2018-09-04 ENCOUNTER — HOSPITAL ENCOUNTER (OUTPATIENT)
Dept: PSYCHIATRY | Age: 28
Setting detail: THERAPIES SERIES
Discharge: HOME | End: 2018-09-04
Payer: MEDICAID

## 2018-09-04 PROCEDURE — 80305 DRUG TEST PRSMV DIR OPT OBS: CPT

## 2018-09-04 PROCEDURE — 90834 PSYTX W PT 45 MINUTES: CPT

## 2018-09-04 PROCEDURE — 90853 GROUP PSYCHOTHERAPY: CPT

## 2018-09-04 NOTE — PROGRESS NOTES
Therapy Note    Date:08/28/2018   Start Time: 5:30 PM   End Time: 8:00 PM  Number of Participants: 7    Support Meetings attended:0    Type of Group: IOP  Topic: Daily check in exercise - Activity: Introduction to Step One/ Education on how addiction is Cunning, Powerful and Baffling. Patient's Goal:  Maintain abstinence/ Develop sober support / Manage cravings & urges/ Eliminate legal problems from [facility].     Notes: 60 minutes: Counselor facilitated daily check in exercise: Ora Lam participated in daily check in exercise by sharing that he has not had any cravings or urges to use opiates, cocaine or any other substances of abuse or addiction. Ora Lam also shared that he was continuing to look for a new sponsor. Additionally Ora Lam shared that he had gotten a good report from his , which indicates that he is meeting goals and objectives of case management plan to address legal issues. Ora Lam was reminded that he needs to schedule an appointment to address mental health, in order to meet goals ans objectives of other case management plan. Client verbalized that he \"will make the needed appointment\". 60 minutes: Counselor lead clients in activity aimed at introducing them to Step One. Ora Lam shared that he has found it useful to express that he is powerless over using substances. He also shared that he was feeling achieved because he has not used any substances. 60 minutes: Counselor provided educational film on the ways that addiction is cunning, powerful and baffling. Ora Lam shared that previously he had relied on opiates or cocaine to provide his with energy and that was one of the ways he was \"tricked\" into thinking (he) needed to uses drugs. \"      Urinalysis Submitted Today? No    Urinalysis Results: 0    If Positive, for which substance:0    Date of Urinalysis Results Review: 0     Urinalysis Results Signed by Client? No    Status After Intervention:  Improved    Participation Level:  Active Listener and Interactive    Participation Quality: Appropriate, Attentive and Sharing      Speech:  normal      Thought Process/Content: Logical      Level of consciousness:  Alert, Oriented x4 and Attentive      Response to Learning: Able to retain information and Capable of insight      Endings: None Reported    Modes of Intervention: Education, Support, Socialization, Exploration, Clarifying, Problem-solving, Activity and Media      Discipline Responsible: [facility]      Signature:  Apollo Reyes

## 2018-09-05 ENCOUNTER — HOSPITAL ENCOUNTER (OUTPATIENT)
Dept: PSYCHIATRY | Age: 28
Setting detail: THERAPIES SERIES
Discharge: HOME | End: 2018-09-05
Payer: MEDICAID

## 2018-09-05 PROCEDURE — 90834 PSYTX W PT 45 MINUTES: CPT

## 2018-09-05 PROCEDURE — 90853 GROUP PSYCHOTHERAPY: CPT

## 2018-09-05 PROCEDURE — 80305 DRUG TEST PRSMV DIR OPT OBS: CPT

## 2018-09-05 NOTE — PROGRESS NOTES
Therapy Note    Date:08/28/2018   Start Time: 5:30 PM   End Time: 8:00 PM  Number of Participants:8    Support Meetings attended:0    Type of Group: IOP  Topic: Daily check in exercise - Discussion on the disease concept of addiction/ Education on the negative effects of marijuana. Patient's Goal:  Maintain abstinence/ Develop sober support / Manage cravings & urges/ Eliminate legal problems from [facility].     Notes: 60 minutes: Counselor facilitated daily check in exercise: Megan Hays participated in the daily check in exercise and shared that he had not had any cravings or urges to use opiates or cocaine. Megan Hays denied that he had been to any recent meetings. Counselor reminded client that meeting attendance is necessary for him to successfully complete treatment at the [facility] level of care. Counselor also reminded client that it is also imperative that he attend meetings in order to obtain a new sponsor. Megan Hays shared that he would \"get back on track. \"60 minutes: Counselor lead clients in discussion on the disease concept of addiction. Megan Hays actively participated in the group discussion. He shared that he recognized the connection between his own feelings of depression and addiction. He also shared that he had made an appointment with Ezra Tarango the following week on Wednesday morning, which indicates that client is meeting case management goal of addressing mental health. 60 minutes: Counselor provided education on the negative effects of using marijuana. Megan Hays was attentive to information provided and also participated in further discussion of psycho- educational material. Megan Hays shared that he did believe that marijuana is a \"gateway drug\" but also shared that when he started using other drugs such as opiates and cocaine, he did not \"worry too much about marijuana anymore. \"      Urinalysis Submitted Today?  Yes    Urinalysis Results: 0    If Positive, for which substance:0    Date of Urinalysis Results Review: 0

## 2018-09-06 NOTE — PROGRESS NOTES
of Intervention: Education, Support, Socialization, Exploration, Clarifying, Problem-solving and Activity      Discipline Responsible: [facility]      Signature:  Kalpana Cárdenas, [facility]

## 2018-09-10 NOTE — PROGRESS NOTES
Therapy Note    Date:09/04/2018   Start Time: 5:30 PM   End Time: 8:00 PM  Number of Participants 10     Support Meetings attended:0    Topic: Daily check in exercise - Discussion on what lead you to recovery/  Education on meeting formats. Patient's Goal:  Maintain abstinence/ Develop sober support / Manage cravings & urges/ Eliminate legal problems from [facility].     Notes: 60 minutes: Counselor facilitated daily check in exercise: Client     60 minutes: Counselor lead clients in a discussion aimed at identifying what lead each group member to recovery. Antoinette Naranjo participated in group session daily check in and denied that he had any current cravings or urges to use opiates or cocaine. Antoinette Naranjo also denied having gone to any sober support group meetings, due to [facility] schedule\". Counselor reminded client that in order to successfully complete the IOP level of care it was important that he adhere to program polices and meet treatment goals and objectives. Additionally counselor reminded client that in recovery it is important to keep the principles of recovery first and learn to balance the rest of life based on the principles of recovery such as the fellowship that meetings offer. 60 minutes: Counselor led client's in discussion aimed at identifying what had led them to treatment. Antoinette Naranjo shared that legal issues led him to treatment. Counselor provided education on the types of 12 step meetings and meeting formats. Client  Was attentive to information provided. Urinalysis Submitted Today? No    Urinalysis Results: negative    If Positive, for which substance:0    Date of Urinalysis Results Review: 08/29/18     Urinalysis Results Signed by Client? Yes    Status After Intervention:  Improved    Participation Level:  Active Listener and Interactive    Participation Quality: Appropriate, Attentive and Sharing      Speech:  normal      Thought Process/Content: Logical      Level of consciousness:  Alert, Oriented x4 and Attentive      Response to Learning: Able to retain information, Capable of insight and Able to change behavior      Endings: [facility]    Modes of Intervention: Education, Support, Socialization, Exploration, Clarifying, Problem-solving and Activity      Discipline Responsible: [facility]      Signature:  Apollo Strickland

## 2018-09-11 ENCOUNTER — HOSPITAL ENCOUNTER (OUTPATIENT)
Dept: PSYCHIATRY | Age: 28
Setting detail: THERAPIES SERIES
Discharge: HOME | End: 2018-09-11
Payer: MEDICAID

## 2018-09-11 PROCEDURE — 80305 DRUG TEST PRSMV DIR OPT OBS: CPT

## 2018-09-11 PROCEDURE — 90853 GROUP PSYCHOTHERAPY: CPT

## 2018-09-12 NOTE — PROGRESS NOTES
Therapy Note    Date:09/05/2018   Start Time: 5:30 PM   End Time: 8:00 PM  Number of Participants 11    Support Meetings attended:0    Topic: Daily check in exercise - Activity - Exploring What to look for in a Sponsor/ Psycho-education -What is Step One? Patient's Goal:  Maintain abstinence/ Develop sober support / Manage cravings & urges/ Eliminate legal problems from [facility].     Notes: 60 minutes: Counselor facilitated daily check in exercise and led clients in activity which explored how to identify useful qualities of a potential sponsor. During daily check in exercise Caden Zaman denies that he has had any cravings or urges to use opiates or cocaine. He verbalized that his sober support comes from \"coming to group sessions, and going to meetings. \" . Counselor pointed out that client has not been going to the required number of meetings recommended for the IOP level of care. Client shared that he Zeus Denga been working a lot but plans to get back on track. \" Client also shared that he needs to find a new sponsor and knows that in order to do so he needs to attend more meetings. 60 minutes: Counselor provided psycho-education introducing clients to Step One. Caden Zaman was appropriately attuned to information provided and shared his insights on Step One and how the principle of being honest is needed to admit and accept addiction. Urinalysis Submitted Today? Yes    Urinalysis Results: 0    If Positive, for which substance:0    Date of Urinalysis Results Review: 0     Urinalysis Results Signed by Client? No    Status After Intervention:  Improved    Participation Level:  Active Listener and Interactive    Participation Quality: Appropriate, Attentive, Sharing and Supportive      Speech:  normal      Thought Process/Content: Logical      Level of consciousness:  Alert, Oriented x4 and Attentive      Response to Learning: Able to retain information and Able to change behavior      Endings: None Reported    Modes of

## 2018-09-14 ENCOUNTER — HOSPITAL ENCOUNTER (OUTPATIENT)
Dept: PSYCHIATRY | Age: 28
Setting detail: THERAPIES SERIES
Discharge: HOME | End: 2018-09-14
Payer: MEDICAID

## 2018-09-14 PROCEDURE — 90853 GROUP PSYCHOTHERAPY: CPT

## 2018-09-17 ENCOUNTER — HOSPITAL ENCOUNTER (OUTPATIENT)
Dept: PSYCHIATRY | Age: 28
Setting detail: THERAPIES SERIES
Discharge: HOME | End: 2018-09-17
Payer: MEDICAID

## 2018-09-17 PROCEDURE — 80305 DRUG TEST PRSMV DIR OPT OBS: CPT

## 2018-09-17 PROCEDURE — 90853 GROUP PSYCHOTHERAPY: CPT

## 2018-09-18 ENCOUNTER — APPOINTMENT (OUTPATIENT)
Dept: PSYCHIATRY | Age: 28
End: 2018-09-18
Payer: MEDICAID

## 2018-09-19 ENCOUNTER — APPOINTMENT (OUTPATIENT)
Dept: PSYCHIATRY | Age: 28
End: 2018-09-19
Payer: MEDICAID

## 2018-09-21 ENCOUNTER — APPOINTMENT (OUTPATIENT)
Dept: PSYCHIATRY | Age: 28
End: 2018-09-21
Payer: MEDICAID

## 2018-09-24 ENCOUNTER — APPOINTMENT (OUTPATIENT)
Dept: PSYCHIATRY | Age: 28
End: 2018-09-24
Payer: MEDICAID

## 2018-09-25 ENCOUNTER — APPOINTMENT (OUTPATIENT)
Dept: PSYCHIATRY | Age: 28
End: 2018-09-25
Payer: MEDICAID

## 2018-10-03 ENCOUNTER — HOSPITAL ENCOUNTER (OUTPATIENT)
Dept: PSYCHIATRY | Age: 28
Setting detail: THERAPIES SERIES
Discharge: HOME | End: 2018-10-03
Payer: MEDICAID

## 2018-10-17 ENCOUNTER — HOSPITAL ENCOUNTER (OUTPATIENT)
Dept: PSYCHIATRY | Age: 28
Setting detail: THERAPIES SERIES
Discharge: HOME | End: 2018-10-17
Payer: MEDICAID

## 2018-10-17 NOTE — DISCHARGE SUMMARY
[address]      Client Name: Shawn   Date of Admission[de-identified] 07/23/2018  Discharge Date: 09/17/2018    Diagnosis: F14.10, F11.10, F10.10, F17.200     Authorized Person's  Name: Fadi Dougherty          License: [place]          Date: 09/17/2018      Degree of Severity- ADMISSION  Degree of Severity- DISCHARGE    Acute Intoxication withdrawal none Acute Intoxication withdrawal none   Biomedical Conditions/  Complications none Biomedical Conditions/  Complications none   Emotional Behavioral/ Cognitive Conditions none  Emotional Behavioral/ Cognitive Conditions none   Treatment Acceptance Resistance moderate Treatment Acceptance Resistance low   Relapse Potential moderate Relapse Potential low   Recovery Environment moderate Recovery Environment low       Comments on Dimensional Criteria: : #1 No withdrawal symptoms reported on admission or discharge. #2 Client is currently under doctors for medically assisted treatment (Suboxone) #3 Client reports no emotional conditions. #4 Client demonstrated low resistance to the program and acceptance of treatment and came to recognize the benefit of the treatment process, and meeting treatment goals and objectives. #5 Client has attended the (2) required AA/NA meetings for the treatment requirement. Therefore, clients risk for relapse is low due to client being able to recognize how poor choices impact sobriety. #6 Clients environment is low due to clients report that there is no substance use in his immediate place of residence. Level of Care and Service Provided during Course of Treatment: Based upon the results of the assessment, which included completion of the admission criteria of the adult protocol for levels of care, client was placed into the IOP program. While involved in IOP the following services were provided: case management, urine drug screening, medical somatic and group counseling.     Client's

## 2019-04-08 ENCOUNTER — HOSPITAL ENCOUNTER (OUTPATIENT)
Dept: PSYCHIATRY | Age: 29
Setting detail: THERAPIES SERIES
Discharge: HOME | End: 2019-04-08
Payer: MEDICAID

## 2019-09-16 ENCOUNTER — HOSPITAL ENCOUNTER (EMERGENCY)
Age: 29
Discharge: HOME | End: 2019-09-16
Attending: FAMILY MEDICINE
Payer: MEDICAID

## 2019-09-16 VITALS
OXYGEN SATURATION: 98 % | HEART RATE: 74 BPM | DIASTOLIC BLOOD PRESSURE: 80 MMHG | BODY MASS INDEX: 27.98 KG/M2 | WEIGHT: 184 LBS | TEMPERATURE: 98.7 F | SYSTOLIC BLOOD PRESSURE: 129 MMHG | RESPIRATION RATE: 16 BRPM

## 2019-09-16 DIAGNOSIS — T15.90XA FOREIGN BODY IN SITE ON EXTERNAL EYE, UNSPECIFIED LATERALITY, INITIAL ENCOUNTER: Primary | ICD-10-CM

## 2019-09-16 PROCEDURE — 6370000000 HC RX 637 (ALT 250 FOR IP): Performed by: FAMILY MEDICINE

## 2019-09-16 PROCEDURE — G0381 LEV 2 HOSP TYPE B ED VISIT: HCPCS

## 2019-09-16 RX ORDER — TETRACAINE HYDROCHLORIDE 5 MG/ML
1 SOLUTION OPHTHALMIC ONCE
Status: DISCONTINUED | OUTPATIENT
Start: 2019-09-16 | End: 2019-09-16 | Stop reason: HOSPADM

## 2019-09-16 RX ORDER — POLYMYXIN B SULFATE AND TRIMETHOPRIM 1; 10000 MG/ML; [USP'U]/ML
1 SOLUTION OPHTHALMIC EVERY 4 HOURS
Qty: 1 BOTTLE | Refills: 0 | Status: SHIPPED | OUTPATIENT
Start: 2019-09-16 | End: 2019-09-26

## 2019-09-16 RX ADMIN — FLUORESCEIN SODIUM 1 MG: 1 STRIP OPHTHALMIC at 20:28

## 2019-09-16 ASSESSMENT — PAIN DESCRIPTION - PROGRESSION: CLINICAL_PROGRESSION: NOT CHANGED

## 2019-09-16 ASSESSMENT — PAIN DESCRIPTION - ORIENTATION: ORIENTATION: LEFT

## 2019-09-16 ASSESSMENT — PAIN DESCRIPTION - LOCATION: LOCATION: EYE

## 2019-09-16 ASSESSMENT — PAIN DESCRIPTION - FREQUENCY: FREQUENCY: CONTINUOUS

## 2019-09-16 ASSESSMENT — PAIN SCALES - GENERAL: PAINLEVEL_OUTOF10: 5

## 2019-09-16 ASSESSMENT — PAIN DESCRIPTION - DESCRIPTORS: DESCRIPTORS: NAGGING

## 2019-09-16 ASSESSMENT — VISUAL ACUITY
OS: 20/20
OD: 20/20

## 2019-09-16 ASSESSMENT — PAIN DESCRIPTION - PAIN TYPE: TYPE: ACUTE PAIN

## 2019-10-10 ENCOUNTER — HOSPITAL ENCOUNTER (EMERGENCY)
Age: 29
Discharge: HOME | End: 2019-10-10
Attending: EMERGENCY MEDICINE
Payer: MEDICAID

## 2019-10-10 VITALS
HEIGHT: 68 IN | SYSTOLIC BLOOD PRESSURE: 128 MMHG | HEART RATE: 88 BPM | OXYGEN SATURATION: 98 % | WEIGHT: 180 LBS | RESPIRATION RATE: 16 BRPM | DIASTOLIC BLOOD PRESSURE: 70 MMHG | BODY MASS INDEX: 27.28 KG/M2 | TEMPERATURE: 98.7 F

## 2019-10-10 DIAGNOSIS — T22.112A BURN OF FIRST DEGREE OF LEFT FOREARM, INITIAL ENCOUNTER: Primary | ICD-10-CM

## 2019-10-10 PROCEDURE — 96374 THER/PROPH/DIAG INJ IV PUSH: CPT

## 2019-10-10 PROCEDURE — 6360000002 HC RX W HCPCS: Performed by: EMERGENCY MEDICINE

## 2019-10-10 PROCEDURE — 2500000003 HC RX 250 WO HCPCS: Performed by: EMERGENCY MEDICINE

## 2019-10-10 PROCEDURE — 99283 EMERGENCY DEPT VISIT LOW MDM: CPT

## 2019-10-10 RX ORDER — KETOROLAC TROMETHAMINE 15 MG/ML
15 INJECTION, SOLUTION INTRAMUSCULAR; INTRAVENOUS ONCE
Status: COMPLETED | OUTPATIENT
Start: 2019-10-10 | End: 2019-10-10

## 2019-10-10 RX ADMIN — KETOROLAC TROMETHAMINE 15 MG: 15 INJECTION, SOLUTION INTRAMUSCULAR; INTRAVENOUS at 16:24

## 2019-10-10 RX ADMIN — SILVER SULFADIAZINE: 10 CREAM TOPICAL at 16:20

## 2019-10-10 ASSESSMENT — PAIN SCALES - GENERAL
PAINLEVEL_OUTOF10: 10
PAINLEVEL_OUTOF10: 10

## 2019-10-10 ASSESSMENT — PAIN DESCRIPTION - DESCRIPTORS: DESCRIPTORS: BURNING

## 2019-10-10 ASSESSMENT — PAIN DESCRIPTION - ORIENTATION: ORIENTATION: RIGHT;DISTAL

## 2019-10-10 ASSESSMENT — PAIN DESCRIPTION - LOCATION: LOCATION: ARM

## 2021-02-03 ENCOUNTER — APPOINTMENT (OUTPATIENT)
Dept: GENERAL RADIOLOGY | Age: 31
End: 2021-02-03
Payer: MEDICAID

## 2021-02-03 ENCOUNTER — HOSPITAL ENCOUNTER (EMERGENCY)
Age: 31
Discharge: HOME | End: 2021-02-03
Payer: MEDICAID

## 2021-02-03 VITALS
WEIGHT: 180 LBS | TEMPERATURE: 97.7 F | OXYGEN SATURATION: 98 % | RESPIRATION RATE: 18 BRPM | BODY MASS INDEX: 27.28 KG/M2 | SYSTOLIC BLOOD PRESSURE: 133 MMHG | DIASTOLIC BLOOD PRESSURE: 74 MMHG | HEIGHT: 68 IN | HEART RATE: 79 BPM

## 2021-02-03 DIAGNOSIS — S93.402A SPRAIN OF LEFT ANKLE, UNSPECIFIED LIGAMENT, INITIAL ENCOUNTER: Primary | ICD-10-CM

## 2021-02-03 PROCEDURE — 73610 X-RAY EXAM OF ANKLE: CPT

## 2021-02-03 PROCEDURE — 73630 X-RAY EXAM OF FOOT: CPT

## 2021-02-03 PROCEDURE — 99283 EMERGENCY DEPT VISIT LOW MDM: CPT

## 2021-02-03 RX ORDER — NAPROXEN 500 MG/1
500 TABLET ORAL 2 TIMES DAILY
Qty: 14 TABLET | Refills: 0 | Status: SHIPPED | OUTPATIENT
Start: 2021-02-03 | End: 2021-07-16 | Stop reason: ALTCHOICE

## 2021-02-03 ASSESSMENT — PAIN DESCRIPTION - LOCATION: LOCATION: ANKLE

## 2021-02-03 ASSESSMENT — PAIN DESCRIPTION - ORIENTATION: ORIENTATION: LEFT

## 2021-02-03 NOTE — ED PROVIDER NOTES
Independent MLP  HPI:  2/3/21, Time: 6:31 PM VANE Hill Junior is a 27 y.o. male presenting to the ED for left ankle foot pain, beginning 1 Day ago. The complaint has been persistent, moderate in severity, and worsened by walking. patient comes in with complaint of left ankle pain injury. The step was broke in half and he stepped on one half of the step causing his ankle to invert. He has some numbness tingling of his right toe and second toe. Full range of motion present he has pain with weightbearing but is able to weight-bear. Denied any head injury no loss of consciousness no neck pain. Review of Systems:   A complete review of systems was performed and pertinent positives and negatives are stated within HPI, all other systems reviewed and are negative.          --------------------------------------------- PAST HISTORY ---------------------------------------------  Past Medical History:  has no past medical history on file. Past Surgical History:  has a past surgical history that includes fracture surgery. Social History:  reports that he has been smoking cigarettes. He has been smoking about 1.00 pack per day. He uses smokeless tobacco. He reports current alcohol use. He reports that he does not use drugs. Family History: family history is not on file. The patients home medications have been reviewed. Allergies: Patient has no known allergies. -------------------------------------------------- RESULTS -------------------------------------------------  All laboratory and radiology results have been personally reviewed by myself   LABS:  No results found for this visit on 02/03/21. RADIOLOGY:  Interpreted by Radiologist.  XR FOOT LEFT (MIN 3 VIEWS)   Final Result   No evidence of fracture or joint dislocation in the left ankle or foot. XR ANKLE LEFT (MIN 3 VIEWS)   Final Result   No evidence of fracture or joint dislocation in the left ankle or foot. ------------------------- NURSING NOTES AND VITALS REVIEWED ---------------------------   The nursing notes within the ED encounter and vital signs as below have been reviewed. /74   Pulse 79   Temp 97.7 °F (36.5 °C)   Resp 18   Ht 5' 8\" (1.727 m)   Wt 180 lb (81.6 kg)   SpO2 98%   BMI 27.37 kg/m²   Oxygen Saturation Interpretation: Normal      ---------------------------------------------------PHYSICAL EXAM--------------------------------------      Constitutional/General: Alert and oriented x3, well appearing, non toxic in NAD  Head: Normocephalic and atraumatic  Eyes: PERRL, EOMI  Mouth: Oropharynx clear, handling secretions, no trismus  Neck: Supple, full ROM,   Pulmonary: Lungs clear to auscultation bilaterally, no wheezes, rales, or rhonchi. Not in respiratory distress  Cardiovascular:  Regular rate and rhythm, no murmurs, gallops, or rubs. 2+ distal pulses  Abdomen: Soft, non tender, non distended, tender to palpation in the lateral malleolus proximal lateral left foot. There is swelling and some ecchymosis noted pulses are normal cap refill less than patient pain   Extremities: Moves all extremities x 4. Warm and well perfused  Skin: warm and dry without rash  Neurologic: GCS 15,  Psych: Normal Affect      ------------------------------ ED COURSE/MEDICAL DECISION MAKING----------------------  Medications - No data to display      ED COURSE:       Medical Decision Making:    Patient came in with complaint of injury to his left ankle foot that occurred yesterday x-ray no acute fracture or dislocation. Patient was placed in a ankle Aircast Ace wrap. He is to follow-up with his primary care 1 to 2 days Naprosyn as needed for pain. Counseling: The emergency provider has spoken with the patient and discussed todays results, in addition to providing specific details for the plan of care and counseling regarding the diagnosis and prognosis.   Questions are answered at this time and they are agreeable with the plan.      --------------------------------- IMPRESSION AND DISPOSITION ---------------------------------    IMPRESSION  1. Sprain of left ankle, unspecified ligament, initial encounter        DISPOSITION  Disposition: Discharge to home  Patient condition is good      NOTE: This report was transcribed using voice recognition software.  Every effort was made to ensure accuracy; however, inadvertent computerized transcription errors may be present     Rosetta Brown, [id number]  02/03/21 0557

## 2021-05-30 ENCOUNTER — APPOINTMENT (OUTPATIENT)
Dept: GENERAL RADIOLOGY | Age: 31
End: 2021-05-30
Payer: MEDICAID

## 2021-05-30 ENCOUNTER — HOSPITAL ENCOUNTER (EMERGENCY)
Age: 31
Discharge: HOME | End: 2021-05-30
Payer: MEDICAID

## 2021-05-30 VITALS
BODY MASS INDEX: 27.28 KG/M2 | HEIGHT: 68 IN | WEIGHT: 180 LBS | TEMPERATURE: 97.7 F | SYSTOLIC BLOOD PRESSURE: 113 MMHG | DIASTOLIC BLOOD PRESSURE: 73 MMHG | OXYGEN SATURATION: 98 % | HEART RATE: 64 BPM | RESPIRATION RATE: 16 BRPM

## 2021-05-30 DIAGNOSIS — S83.92XA SPRAIN OF LEFT KNEE, UNSPECIFIED LIGAMENT, INITIAL ENCOUNTER: Primary | ICD-10-CM

## 2021-05-30 PROCEDURE — 73562 X-RAY EXAM OF KNEE 3: CPT

## 2021-05-30 PROCEDURE — 99211 OFF/OP EST MAY X REQ PHY/QHP: CPT

## 2021-05-30 RX ORDER — IBUPROFEN 800 MG/1
800 TABLET ORAL EVERY 6 HOURS PRN
Qty: 21 TABLET | Refills: 0 | Status: ON HOLD | OUTPATIENT
Start: 2021-05-30 | End: 2021-07-19 | Stop reason: HOSPADM

## 2021-05-30 ASSESSMENT — PAIN DESCRIPTION - ORIENTATION
ORIENTATION: LEFT
ORIENTATION: LEFT

## 2021-05-30 ASSESSMENT — PAIN DESCRIPTION - PAIN TYPE
TYPE: ACUTE PAIN
TYPE: ACUTE PAIN

## 2021-05-30 ASSESSMENT — PAIN DESCRIPTION - DESCRIPTORS: DESCRIPTORS: THROBBING

## 2021-05-30 ASSESSMENT — PAIN DESCRIPTION - PROGRESSION
CLINICAL_PROGRESSION: GRADUALLY IMPROVING
CLINICAL_PROGRESSION: GRADUALLY WORSENING

## 2021-05-30 ASSESSMENT — PAIN - FUNCTIONAL ASSESSMENT
PAIN_FUNCTIONAL_ASSESSMENT: 0-10
PAIN_FUNCTIONAL_ASSESSMENT: PREVENTS OR INTERFERES SOME ACTIVE ACTIVITIES AND ADLS
PAIN_FUNCTIONAL_ASSESSMENT: PREVENTS OR INTERFERES SOME ACTIVE ACTIVITIES AND ADLS

## 2021-05-30 ASSESSMENT — PAIN DESCRIPTION - LOCATION
LOCATION: KNEE
LOCATION: KNEE

## 2021-05-30 ASSESSMENT — PAIN DESCRIPTION - FREQUENCY
FREQUENCY: CONTINUOUS
FREQUENCY: CONTINUOUS

## 2021-05-30 ASSESSMENT — PAIN SCALES - GENERAL: PAINLEVEL_OUTOF10: 4

## 2021-05-30 ASSESSMENT — PAIN DESCRIPTION - ONSET
ONSET: ON-GOING
ONSET: ON-GOING

## 2021-05-30 NOTE — ED PROVIDER NOTES
HPI:  5/30/21,   Time: 3:48 PM EDT         Rajwinder Swan is a 27 y.o. male presenting to the ED for left knee pain, beginning earlier today ago. The complaint has been persistent, mild in severity, and worsened by nothing. Presents for complaints of left knee pain which he states began earlier today. He states that he twisted the left knee earlier today and then twisted it again later on in the afternoon. He states every time he attempts to walk it appears as though the knee wants to [place] out\". He denies any history of known problems with the left knee. He has no visible swelling or deformity. He denies any known injury and denies any known fall onto the left knee. ROS:   Pertinent positives and negatives are stated within HPI, all other systems reviewed and are negative.  --------------------------------------------- PAST HISTORY ---------------------------------------------  Past Medical History:  has no past medical history on file. Past Surgical History:  has a past surgical history that includes fracture surgery. Social History:  reports that he has been smoking cigarettes. He has been smoking about 1.00 pack per day. He uses smokeless tobacco. He reports current alcohol use. He reports that he does not use drugs. Family History: family history is not on file. The patients home medications have been reviewed. Allergies: Patient has no known allergies. -------------------------------------------------- RESULTS -------------------------------------------------  All laboratory and radiology results have been personally reviewed by myself   LABS:  No results found for this visit on 05/30/21. RADIOLOGY:  Interpreted by Radiologist.  XR KNEE LEFT (3 VIEWS)   Final Result   Subtle lucency is seen through the medial patellar facet.   Correlate with   point tenderness as this could represent nondisplaced patellar fracture      Small joint effusion.             ------------------------- NURSING NOTES AND VITALS REVIEWED ---------------------------   The nursing notes within the ED encounter and vital signs as below have been reviewed. /73   Pulse 64   Temp 97.7 °F (36.5 °C) (Temporal)   Resp 16   Ht 5' 8\" (1.727 m)   Wt 180 lb (81.6 kg)   SpO2 98%   BMI 27.37 kg/m²   Oxygen Saturation Interpretation: Normal      ---------------------------------------------------PHYSICAL EXAM--------------------------------------      Constitutional/General: Alert and oriented x3, well appearing, non toxic in NAD  Head: NC/AT  Eyes: PERRL, EOMI  Mouth: Oropharynx clear, handling secretions, no trismus  Neck: Supple, full ROM, no meningeal signs  Pulmonary: Lungs clear to auscultation bilaterally, no wheezes, rales, or rhonchi. Not in respiratory distress  Cardiovascular:  Regular rate and rhythm, no murmurs, gallops, or rubs. 2+ distal pulses  Abdomen: Soft, non tender, non distended,   Extremities: Moves all extremities x 4. Warm and well perfused, he has tenderness on palpation the lateral aspect of the left knee. There is mild joint laxity on examination. He does have some mild posterior tenderness of the left knee area. He has no tenderness at the patella itself. Positive anterior drawer concerning for a ligamentous injury. Pedal pulse are palpable 2+ capillary refill less than 3 seconds. No calf tenderness on exam.  Skin: warm and dry without rash  Neurologic: GCS 15,  Psych: Normal Affect      ------------------------------ ED COURSE/MEDICAL DECISION MAKING----------------------  Medications - No data to display      Medical Decision Making:    Knee immobilizer and crutches provided encouraged use rest, ice, compression elevation follow-up with PCP will likely need to have an outpatient nonemergent MRI for further evaluation. He was made aware of x-ray findings however he has no tenderness at the patella itself and were concerning for a ligamentous injury. Counseling:    The emergency provider has spoken with the patient and discussed todays results, in addition to providing specific details for the plan of care and counseling regarding the diagnosis and prognosis. Questions are answered at this time and they are agreeable with the plan.      --------------------------------- IMPRESSION AND DISPOSITION ---------------------------------    IMPRESSION  1.  Sprain of left knee, unspecified ligament, initial encounter        DISPOSITION  Disposition: Discharge to home  Patient condition is good                  MARC Bernal CNP  05/30/21 1827

## 2021-06-04 ENCOUNTER — OFFICE VISIT (OUTPATIENT)
Dept: ORTHOPEDIC SURGERY | Age: 31
End: 2021-06-04
Payer: MEDICAID

## 2021-06-04 VITALS — WEIGHT: 180 LBS | BODY MASS INDEX: 27.28 KG/M2 | TEMPERATURE: 98 F | HEIGHT: 68 IN

## 2021-06-04 DIAGNOSIS — S83.207A TEAR OF MENISCUS OF LEFT KNEE, UNSPECIFIED MENISCUS, UNSPECIFIED TEAR TYPE, UNSPECIFIED WHETHER OLD OR CURRENT TEAR: ICD-10-CM

## 2021-06-04 DIAGNOSIS — S80.02XA CONTUSION OF LEFT KNEE, INITIAL ENCOUNTER: Primary | ICD-10-CM

## 2021-06-04 PROCEDURE — G8419 CALC BMI OUT NRM PARAM NOF/U: HCPCS | Performed by: ORTHOPAEDIC SURGERY

## 2021-06-04 PROCEDURE — 99203 OFFICE O/P NEW LOW 30 MIN: CPT | Performed by: ORTHOPAEDIC SURGERY

## 2021-06-04 PROCEDURE — 4004F PT TOBACCO SCREEN RCVD TLK: CPT | Performed by: ORTHOPAEDIC SURGERY

## 2021-06-04 PROCEDURE — G8427 DOCREV CUR MEDS BY ELIG CLIN: HCPCS | Performed by: ORTHOPAEDIC SURGERY

## 2021-06-04 NOTE — PROGRESS NOTES
Chief Complaint   Patient presents with    Knee Pain     new patient left knee pain present since 5/30/21. Patient states the knee gave out getting out of his car. HPI:    Patient is 27 y.o. male complaining chronic, atraumatic, insidious onset left knee pain for several weeks after feeling a pop getting out of the car. He admits to stiffness, deep, aching pain, swelling, difficulty with stairs and ambulating far distances. He admits to gross instability specifically in his Left knee. Previous treatments include rest, ice, and anti-inflammatory medication and HEP without much relief. ROS:    Skin: (-) rash,(-) psoriasis,(-) eczema, (-)skin cancer. Neurologic: (-)numbness, (-)tingling, (-)headaches, (-) LOC. Cardiovascular: (-) Chest pain, (-) swelling in legs/feet, (-) SOB, (-) cramping in legs/feet with walking. All other review of systems negative except stated above or in HPI      No past medical history on file. Past Surgical History:   Procedure Laterality Date    FRACTURE SURGERY         Current Outpatient Medications:     ibuprofen (ADVIL;MOTRIN) 800 MG tablet, Take 1 tablet by mouth every 6 hours as needed for Pain, Disp: 21 tablet, Rfl: 0    buprenorphine-naloxone (SUBOXONE) 8-2 MG FILM SL film, Place 2 Film under the tongue daily. ., Disp: , Rfl:     naproxen (NAPROSYN) 500 MG tablet, Take 1 tablet by mouth 2 times daily for 7 days, Disp: 14 tablet, Rfl: 0  No Known Allergies  Social History     Socioeconomic History    Marital status: Single     Spouse name: Not on file    Number of children: Not on file    Years of education: Not on file    Highest education level: Not on file   Occupational History    Not on file   Tobacco Use    Smoking status: Current Every Day Smoker     Packs/day: 1.00     Types: Cigarettes    Smokeless tobacco: Current User   Vaping Use    Vaping Use: Some days   Substance and Sexual Activity    Alcohol use: Yes     Comment: rarely    Drug use:  No Comment: 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  Sexual activity: Yes     Partners: Female   Other Topics Concern    Not on file   Social History Narrative    Not on file     Social Determinants of Health     Financial Resource Strain:     Difficulty of Paying Living Expenses:    Food Insecurity:     Worried About Running Out of Food in the Last Year:     [address] in the Last Year:    Transportation Needs:     Lack of Transportation (Medical):  Lack of Transportation (Non-Medical):    Physical Activity:     Days of Exercise per Week:     Minutes of Exercise per Session:    Stress:     Feeling of Stress :    Social Connections:     Frequency of Communication with Friends and Family:     Frequency of Social Gatherings with Friends and Family:     Attends [employer] Services:     Active Member of Clubs or Organizations:     Attends Club or Organization Meetings:     Marital Status:    Intimate Partner Violence:     Fear of Current or Ex-Partner:     Emotionally Abused:     Physically Abused:     Sexually Abused:      No family history on file. Physical Exam:    Temp 98 °F (36.7 °C)   Ht 5' 8\" (1.727 m)   Wt 180 lb (81.6 kg)   BMI 27.37 kg/m²     GENERAL: alert, appears stated age, cooperative, no acute distress    HEENT: Head is normocephalic, atraumatic. PERRLA. SKIN: Clean, dry, intact. There is not any cellulitis or cutaneous lesions noted in the lower extremities except noted below in MSK    PULMONARY: breathing is regular and unlabored, no acute distress    CV: The bilateral upper and lower extremities are warm and well-perfused with brisk capillary refill. 2+ pulses UE and LE bilateral.     PSYCHIATRY: Pleasant mood, appropriate behavior, follows commands    NEURO: Sensation is intact distally with light touch with no alteration. Motor exam of the lower extremities show quadriceps, hamstrings, foot dorsiflexion and plantarflexion grossly intact 5/5.     LYMPH: No lymphedema present distally in upper or lower extremity. MUSCULOSKELETAL:    Left knee Exam:    mild effusion noted. No erythema/induration/fluctuance. Posterior medial joint line TTP. Stable to varus and valgus at 0 and 30 degrees of flexion. Negative Lachman's and posterior drawer. Negative patellar grind test and J sign. Compartments soft and compressible throughout leg. Active range of motion 0-120 with pain. Positive Carlie's positive Apley's, gait is antalgic        Imaging:  XR KNEE LEFT (3 VIEWS)    Result Date: 5/30/2021  EXAMINATION: THREE XRAY VIEWS OF THE LEFT KNEE 5/30/2021 2:47 pm COMPARISON: None. HISTORY: ORDERING SYSTEM PROVIDED HISTORY: pain TECHNOLOGIST PROVIDED HISTORY: Reason for exam:->pain FINDINGS: Alignment appears normal.  Sclerotic density is seen in the proximal tibia, likely bone island. There is a subtle lucency seen through the medial patellar facet on the sunrise view. Small joint effusion is seen     Subtle lucency is seen through the medial patellar facet. Correlate with point tenderness as this could represent nondisplaced patellar fracture Small joint effusion. Maylon Mohs was seen today for knee pain. Diagnoses and all orders for this visit:    Contusion of left knee, initial encounter  -     MRI KNEE LEFT WO CONTRAST; Future    Tear of meniscus of left knee, unspecified meniscus, unspecified tear type, unspecified whether old or current tear  -     MRI KNEE LEFT WO CONTRAST; Future        Patient seen and examined. X-rays reviewed. Patient has little to no arthritis noted on x-rays today. However patient's main complaint is instability of symptomatic knee. Exam and history is consistent with possible meniscus tear. MRI recommended for further evaluation management.   Follow-up after MRI        Estrada Blankenship DO  6/4/21

## 2021-06-09 ENCOUNTER — OFFICE VISIT (OUTPATIENT)
Dept: ORTHOPEDIC SURGERY | Age: 31
End: 2021-06-09
Payer: MEDICAID

## 2021-06-09 VITALS — BODY MASS INDEX: 27.28 KG/M2 | HEIGHT: 68 IN | WEIGHT: 180 LBS

## 2021-06-09 DIAGNOSIS — S82.132A CLOSED FRACTURE OF MEDIAL PORTION OF LEFT TIBIAL PLATEAU, INITIAL ENCOUNTER: Primary | ICD-10-CM

## 2021-06-09 DIAGNOSIS — S83.512A RUPTURE OF ANTERIOR CRUCIATE LIGAMENT OF LEFT KNEE, INITIAL ENCOUNTER: ICD-10-CM

## 2021-06-09 DIAGNOSIS — M25.462 EFFUSION OF LEFT KNEE: ICD-10-CM

## 2021-06-09 DIAGNOSIS — S80.02XA CONTUSION OF LEFT KNEE, INITIAL ENCOUNTER: ICD-10-CM

## 2021-06-09 PROCEDURE — 27530 TREAT KNEE FRACTURE: CPT | Performed by: ORTHOPAEDIC SURGERY

## 2021-06-09 PROCEDURE — G8419 CALC BMI OUT NRM PARAM NOF/U: HCPCS | Performed by: ORTHOPAEDIC SURGERY

## 2021-06-09 PROCEDURE — G8427 DOCREV CUR MEDS BY ELIG CLIN: HCPCS | Performed by: ORTHOPAEDIC SURGERY

## 2021-06-09 PROCEDURE — 4004F PT TOBACCO SCREEN RCVD TLK: CPT | Performed by: ORTHOPAEDIC SURGERY

## 2021-06-09 PROCEDURE — 99214 OFFICE O/P EST MOD 30 MIN: CPT | Performed by: ORTHOPAEDIC SURGERY

## 2021-06-09 RX ORDER — NAPROXEN 500 MG/1
500 TABLET ORAL 2 TIMES DAILY WITH MEALS
Qty: 60 TABLET | Refills: 0 | Status: SHIPPED
Start: 2021-06-09 | End: 2021-07-16 | Stop reason: ALTCHOICE

## 2021-06-09 NOTE — PROGRESS NOTES
Chief Complaint   Patient presents with    Knee Pain     Left knee MRI follow up. HPI:    Patient is 27 y.o. male complaining chronic, atraumatic, insidious onset left knee pain for several weeks after feeling a pop getting out of the car. He admits to stiffness, deep, aching pain, swelling, difficulty with stairs and ambulating far distances. He admits to gross instability specifically in his Left knee. Previous treatments include rest, ice, and anti-inflammatory medication and HEP without much relief. Follows up after MRI. ROS:    Skin: (-) rash,(-) psoriasis,(-) eczema, (-)skin cancer. Neurologic: (-)numbness, (-)tingling, (-)headaches, (-) LOC. Cardiovascular: (-) Chest pain, (-) swelling in legs/feet, (-) SOB, (-) cramping in legs/feet with walking. All other review of systems negative except stated above or in HPI      No past medical history on file. Past Surgical History:   Procedure Laterality Date    FRACTURE SURGERY         Current Outpatient Medications:     naproxen (NAPROSYN) 500 MG tablet, Take 1 tablet by mouth 2 times daily (with meals), Disp: 60 tablet, Rfl: 0    ibuprofen (ADVIL;MOTRIN) 800 MG tablet, Take 1 tablet by mouth every 6 hours as needed for Pain, Disp: 21 tablet, Rfl: 0    naproxen (NAPROSYN) 500 MG tablet, Take 1 tablet by mouth 2 times daily for 7 days, Disp: 14 tablet, Rfl: 0    buprenorphine-naloxone (SUBOXONE) 8-2 MG FILM SL film, Place 2 Film under the tongue daily. ., Disp: , Rfl:   No Known Allergies  Social History     Socioeconomic History    Marital status: Single     Spouse name: Not on file    Number of children: Not on file    Years of education: Not on file    Highest education level: Not on file   Occupational History    Not on file   Tobacco Use    Smoking status: Current Every Day Smoker     Packs/day: 1.00     Types: Cigarettes    Smokeless tobacco: Current User   Vaping Use    Vaping Use: Some days   Substance and Sexual Activity    grossly intact 5/5. LYMPH: No lymphedema present distally in upper or lower extremity. MUSCULOSKELETAL:    Left knee Exam:    mild effusion noted. No erythema/induration/fluctuance. Posterior medial joint line TTP. Stable to varus and valgus at 0 and 30 degrees of flexion. Negative Lachman's and posterior drawer. Negative patellar grind test and J sign. Compartments soft and compressible throughout leg. Active range of motion 0-120 with pain. Positive Carlie's positive Apley's, gait is antalgic        Imaging:  XR KNEE LEFT (3 VIEWS)    Result Date: 5/30/2021  EXAMINATION: THREE XRAY VIEWS OF THE LEFT KNEE 5/30/2021 2:47 pm COMPARISON: None. HISTORY: ORDERING SYSTEM PROVIDED HISTORY: pain TECHNOLOGIST PROVIDED HISTORY: Reason for exam:->pain FINDINGS: Alignment appears normal.  Sclerotic density is seen in the proximal tibia, likely bone island. There is a subtle lucency seen through the medial patellar facet on the sunrise view. Small joint effusion is seen     Subtle lucency is seen through the medial patellar facet. Correlate with point tenderness as this could represent nondisplaced patellar fracture Small joint effusion. MRI KNEE LEFT WO CONTRAST    Result Date: 6/4/2021  EXAMINATION: MRI OF THE LEFT KNEE WITHOUT CONTRAST, 6/4/2021 1:47 pm TECHNIQUE: Multiplanar multisequence MRI of the left knee was performed without the administration of intravenous contrast. COMPARISON: Left knee x-rays 05/30/2021 HISTORY: ORDERING SYSTEM PROVIDED HISTORY: Contusion of left knee, initial encounter FINDINGS: MENISCI: Medial and lateral menisci appear intact and normal in morphology. No parameniscal cysts are seen. CRUCIATE LIGAMENTS: ACL is ruptured. PCL appears intact. EXTENSOR MECHANISM: Quadriceps and patellar tendons appear intact and unremarkable. Medial and lateral patellar retinacula appear intact. LATERAL COLLATERAL LIGAMENT COMPLEX: Lateral collateral ligament complex appears intact.   There is edema noted about the components of the lateral collateral ligament complex without abnormality noted to the complex itself. Popliteus tendon appears intact. Mild edema in the popliteus muscle. MEDIAL COLLATERAL LIGAMENT COMPLEX: Medial collateral ligament appears intact. There is mild subcutaneous edema about the MCL. KNEE JOINT: Large knee joint effusion. Trace fat fluid level. No intra-articular loose bodies noted. No significant Baker's cyst. No degenerative changes are noted. No focal full-thickness chondral defects or osteochondral lesions. BONE MARROW: Nondepressed and nondisplaced subchondral fracture to the posterior aspect of the medial and tibial plateau with associated mild bone marrow edema. Mild bone marrow edema to the posterior aspect of the lateral tibial plateau without fracture line. No suspicious focal bony lesions. Rupture of the ACL. Nondisplaced and nondepressed small subchondral fracture to the posterior aspect of the medial tibial plateau with associated bone marrow edema. Likely bone marrow contusion to the posterior aspect of the lateral tibial plateau without fracture line. Large joint effusion with trace fat fluid level. Low-grade sprains of the lateral collateral ligament complex and medial collateral ligament. Low-grade strain/contusion of the popliteus muscle. The findings were sent to the Radiology Results [id number] at 2:20 pm on 6/4/2021to be communicated to caregiver's office. Devyn Mtz was seen today for knee pain.     Diagnoses and all orders for this visit:    Closed fracture of medial portion of left tibial plateau, initial encounter  -     Cancel: Amb External Referral To Physical Therapy  -     [facility]    Rupture of anterior cruciate ligament of left knee, initial encounter  -     Cancel: Amb External Referral To Physical Therapy  [facility]    Contusion of left knee, initial encounter    Effusion of left knee  -     [facility]    Other orders  -     naproxen (NAPROSYN) 500 MG tablet; Take 1 tablet by mouth 2 times daily (with meals)        Patient seen and examined. X-rays reviewed. Patient has little to no arthritis noted on x-rays today. However patient's main complaint is instability of symptomatic knee. Exam and history is consistent with possible meniscus tear. MRI recommended for further evaluation management. Patient seen and examined. MRI reviewed with patient in detail. Natural history and course discussed with patient in long discussion  Treatment options discussed with patient in detail including risks and benefits. Patient should do well with conservative management as patient would like to avoid surgery at this time regards to the medial plateau fracture. Patient will be offered rehabilitation for his ACL and range of motion to avoid arthrofibrosis. Follow-up in 2 to 4 weeks for recheck. In a 15 minute assessment and discussion, patient was provided and fitted for a removable knee brace distributed and applied. He was educated in detail how to use brace and the importance of use as well as range of motion and HEP exercises. Yordy Fox DO          25 minutes was spent with patient. 50% or greater was spent counseling the patient.

## 2021-06-10 ENCOUNTER — EVALUATION (OUTPATIENT)
Dept: PHYSICAL THERAPY | Age: 31
End: 2021-06-10
Payer: MEDICAID

## 2021-06-10 DIAGNOSIS — S82.132A CLOSED FRACTURE OF MEDIAL PORTION OF LEFT TIBIAL PLATEAU, INITIAL ENCOUNTER: ICD-10-CM

## 2021-06-10 DIAGNOSIS — S83.512A RUPTURE OF ANTERIOR CRUCIATE LIGAMENT OF LEFT KNEE, INITIAL ENCOUNTER: Primary | ICD-10-CM

## 2021-06-10 PROCEDURE — 97162 PT EVAL MOD COMPLEX 30 MIN: CPT | Performed by: PHYSICAL THERAPIST

## 2021-06-10 NOTE — PROGRESS NOTES
PROVIDED HISTORY: Contusion of left knee, initial encounter FINDINGS: MENISCI: Medial and lateral menisci appear intact and normal in morphology. No parameniscal cysts are seen. CRUCIATE LIGAMENTS: ACL is ruptured. PCL appears intact. EXTENSOR MECHANISM: Quadriceps and patellar tendons appear intact and unremarkable. Medial and lateral patellar retinacula appear intact. LATERAL COLLATERAL LIGAMENT COMPLEX: Lateral collateral ligament complex appears intact. There is edema noted about the components of the lateral collateral ligament complex without abnormality noted to the complex itself. Popliteus tendon appears intact. Mild edema in the popliteus muscle. MEDIAL COLLATERAL LIGAMENT COMPLEX: Medial collateral ligament appears intact. There is mild subcutaneous edema about the MCL. KNEE JOINT: Large knee joint effusion. Trace fat fluid level. No intra-articular loose bodies noted. No significant Baker's cyst. No degenerative changes are noted. No focal full-thickness chondral defects or osteochondral lesions. BONE MARROW: Nondepressed and nondisplaced subchondral fracture to the posterior aspect of the medial and tibial plateau with associated mild bone marrow edema. Mild bone marrow edema to the posterior aspect of the lateral tibial plateau without fracture line. No suspicious focal bony lesions. Rupture of the ACL. Nondisplaced and nondepressed small subchondral fracture to the posterior aspect of the medial tibial plateau with associated bone marrow edema. Likely bone marrow contusion to the posterior aspect of the lateral tibial plateau without fracture line. Large joint effusion with trace fat fluid level. Low-grade sprains of the lateral collateral ligament complex and medial collateral ligament. Low-grade strain/contusion of the popliteus muscle. The findings were sent to the Radiology Results [address] at 2:20 pm on 6/4/2021to be communicated to caregiver's office.        Past Medical History:  No past medical history on file. Past Surgical History:   Procedure Laterality Date    FRACTURE SURGERY         Medications:   Current Outpatient Medications   Medication Sig Dispense Refill    naproxen (NAPROSYN) 500 MG tablet Take 1 tablet by mouth 2 times daily (with meals) 60 tablet 0    ibuprofen (ADVIL;MOTRIN) 800 MG tablet Take 1 tablet by mouth every 6 hours as needed for Pain 21 tablet 0    naproxen (NAPROSYN) 500 MG tablet Take 1 tablet by mouth 2 times daily for 7 days 14 tablet 0    buprenorphine-naloxone (SUBOXONE) 8-2 MG FILM SL film Place 2 Film under the tongue daily. .       No current facility-administered medications for this visit. Occupation: construction. Physical demands include: lifting, carrying, pushing, pulling medium weighted items, lifting, carrying, pushing, pulling heavy weighted items, lifting, carrying, pushing, pulling light weighted items, walking, standing. Status: full time. Exercise regimen: none    Hobbies: yard work, working around "[employer], return to work    Patient Goals: pain relief, return to prior activity, get back to normal    Precautions/Contraindications: none    OBJECTIVE:     Observations: Well nourished male with normal affect. Rises well from chair. Ambulates with one crutch .     Edema: mild global    Gait: antalgic, altered with short step length, width, height    Joint/Motion:    Knee:  Right:   AROM: WNL° Flexion,  WNL° Extension  PROM: WNL° Flexion,  WNL° Extension  Left:   AROM: 92° Flexion,  -20° Extension  PROM: 94° Flexion,  -18° Extension    Strength:    Knee:   Right: Flexion 5/5,  Extension 5/5  Left: Flexion 2-/5,  Extension 2-/5    Palpation: Tender to palpation popliteal.      ASSESSMENT     Outcome Measure:   Lower Extremity Functional Scale (LEFS) 73% impairment    Problems:    Pain reported 8/10   ROM decreased   Strength decreased 2-/5   Decreased functional ability with walking, stairs, work, ADLs, use of left lower extremity, bending, lifting, driving    [x] There are no barriers affecting plan of care or recovery    [] Barriers to this patient's plan of care or recovery include. Domestic Concerns:  [x] No  [] Yes:    Short Term goals (1-2 weeks)   Decrease reported pain to 5/10   Increase ROM to [facility]   Increase Strength to 3-/5    Able to perform/complete the following functions/tasks: Perform ADLs, hobbies, housework with less pain.  Lower Extremity Functional Scale (LEFS) 60% impairment    Long Term goals (3-4 weeks)   Decrease reported pain to 0-4/10   Increase ROM to WNL   Increase Strength to 3+/5     Able to perform/complete the following functions/tasks: Perform ADLs, hobbies, housework with no/minimal pain.  Independent with Home Exercise Programs   Lower Extremity Functional Scale (LEFS) 0-50% impairment    Rehab Potential: [x] Good  [] Fair  [] Poor    PLAN       Treatment Plan:  [x] Therapeutic Exercise  [x] Therapeutic Activity  [x] Neuromuscular Re-education   [x] Gait Training  [x] Balance Training  [] Aerobic conditioning  [] Manual Therapy  [] Massage/Fascial release   [] Work/Sport specific activities    [] Pain Neuroscience [x] Cold/hotpack  [] Vasocompression  [] Electrical Stimulation  [] Lumbar/Cervical Traction  [] Ultrasound   [] Iontophoresis: 4 mg/mL Dexamethasone Sodium Phosphate 40-80 mAmin        [x] Instruction in HEP      []  Medication allergies reviewed for use of Dexamethasone Sodium Phosphate 4mg/ml  with iontophoresis treatments. Patient is not allergic.       The following CPT codes are likely to be used in the care of this patient: 94727 PT Evaluation: Moderate Complexity , 81146 PT Re-Evaluation , 17537 Therapeutic Exercise , 01763 Neuromuscular Re-Education , 66928 Therapeutic Activities , 51461 Manual Therapy , 17790 Gait Training , 49553 Vasopneumatic Device ,  Electrical Stimulation      Suggested Professional Referral: [x] No  [] Yes:     Patient Education:  [x] Plans/Goals, Risks/Benefits discussed  [x] Home exercise program  Method of Education: [x] Verbal  [x] Demo  [x] Written  Comprehension of Education:  [x] Verbalizes understanding. [x] Demonstrates understanding. [] Needs Review. [] Demonstrates/verbalizes understanding of HEP/Ed previously given. Frequency:  1-2 days per week for 3-4 weeks    Patient understands diagnosis/prognosis and consents to treatment, plan and goals: [x] Yes    [] No     Thank you for the opportunity to work with your patient. If you have questions or comments, please contact me at [phone]; fax: [phone]. Electronically signed by: Lisa Rosado PT         Please sign Physician's Certification and return to: [address] [place] [address]  Dept: [phone]  Dept Fax: [phone] Certification / Comments     Frequency/Duration 1-2 days per week for 3-4 weeks. Certification period from 6/10/2021  to 9/09/2021. I have reviewed the Plan of Care established for skilled therapy services and certify that the services are required and that they will be provided while the patient is under my care.     Physician's Comments/Revisions:               Physician's Printed Name:                                           [de-identified] Signature:                                                               Date:

## 2021-06-10 NOTE — PROGRESS NOTES
Physical Therapy Daily Treatment Note    Date: 6/10/2021  Patient Name: Jonna Gresham  : 1990   MRN: [id number]  DOInjury: 2021  DOSx: TBD  Referring Provider: Rosenda Mueller DO  1932 [address] [facility]:      Diagnosis Orders   1. Rupture of anterior cruciate ligament of left knee, initial encounter     2. Closed fracture of medial portion of left tibial plateau, initial encounter         Outcome Measure:  Lower Extremity Functional Scale (LEFS) 73% impairment on eval    S: See eval  O: Mod edema  Time  2488-1809       Visit  1 Repeat outcome measure at mid point and end. Pain    5/10     ROM  92/94 flex, -18/-20 ext     Modalities       Ice, compression, elevation           Stretching       Patella mobs      Prone hangs      Heel props      Prone self flexion stretch            Exercise       Nustep  NEXT     Heel slides HEP     Quad sets HEP     HS sets HEP     SLR HEP     HS stretch HEP     Knee flex stretch-seated HEP     SAQ HEP     LAQ NEXT     HS in sitting with pillow case            Marching       Standing Hip Abduction      Standing Hip Flexion      Standing Hip Extension      Standing HS Curl      Toe Raises      Squats       Sit to [employer]      Wall Squats      Split Squats      [facility] Split Squats      Lunges      Eccentric Heel Tap      Terminal Knee Extension with Grey Band      SLS            Functional Activity       Step-ups - FWD       Step-ups - LAT      Step-ups - BWD       Step up and over reciprocally       TG squats      TG Calf raises            Weighted Machines      Leg Press      Toe Raises      Leg Curl       Knee Ext       NMR For safe ambulation in community and home    Marching gait      Side stepping      Retrowalk      Lunges      Lateral Lunges      Heel to toe      A: Tolerated well. Above added to written HEP along with instructions for ice and elevation.   P: Continue with rehab plan  Rosmery Carrera, PT    Treatment Charges: Mins Units   Initial Evaluation 40 1   Re-Evaluation     Ther Exercise         TE     Manual Therapy     MT     Ther Activities        TA     Gait Training          GT     Neuro Re-education NR     Modalities     Non-Billable Service Time     Other     Total Time/Units 40 1

## 2021-06-18 ENCOUNTER — TREATMENT (OUTPATIENT)
Dept: PHYSICAL THERAPY | Age: 31
End: 2021-06-18
Payer: MEDICAID

## 2021-06-18 DIAGNOSIS — S83.512A RUPTURE OF ANTERIOR CRUCIATE LIGAMENT OF LEFT KNEE, INITIAL ENCOUNTER: ICD-10-CM

## 2021-06-18 DIAGNOSIS — S82.132A CLOSED FRACTURE OF MEDIAL PORTION OF LEFT TIBIAL PLATEAU, INITIAL ENCOUNTER: Primary | ICD-10-CM

## 2021-06-18 PROCEDURE — 97530 THERAPEUTIC ACTIVITIES: CPT

## 2021-06-18 PROCEDURE — 97110 THERAPEUTIC EXERCISES: CPT

## 2021-06-18 NOTE — PROGRESS NOTES
Physical Therapy Daily Treatment Note    Date: 2021  Patient Name: Cleopatra Lassiter  : 1990   MRN: [id number]  DOInjury: 2021  DOSx: TBD  Referring Provider:  Mary Valles DO    Medical Diagnosis:   1. Rupture of anterior cruciate ligament of left knee, initial encounter      2. Closed fracture of medial portion of left tibial plateau, initial encounter           Outcome Measure:  Lower Extremity Functional Scale (LEFS) 73% impairment on eval    S: starting to feel better  O: Mod edema,  No surgery date scheduled as of yet. Due to fx of tibia  Time  840-       Visit  2 Repeat outcome measure at mid point and end. Pain   3/10     ROM  92/94 flex, -18/-20 ext     Modalities       Ice, compression, elevation           Stretching       Patella mobs      Prone hangs      Heel props 2 x 2 min HEP    Prone self flexion stretch            Exercise       Nustep  L5/ 8 min     Heel slides HEP     Quad sets HEP     HS sets HEP     SLR HEP     HS stretch HEP     Knee flex stretch-seated HEP     SAQ HEP     LAQ 2# 3 x 10     HS in sitting with pillow case            Marching  2# 3 x 10     Standing Hip Abduction 2# 3 x 10     Standing Hip Flexion      Standing Hip Extension      Standing HS Curl 2# 3 x 10     Toe Raises 2# 3 x 10     Squats       Sit to [employer] Squats      Split Squats      [facility] Split Squ[facility]      Lunges      Eccentric Heel Tap      Terminal Knee Extension with Grey Band      SLS            Functional Activity       Step-ups - FWD       Step-ups - LAT      Step-ups - BWD       Step up and over reciprocally       TG squats      TG Calf raises            Weighted Machines      Leg Press      Toe Raises      Leg Curl       Knee Ext            Marching gait      Side stepping      Retrowalk      Lunges      Lateral Lunges      Heel to toe      A: Tolerated well. Above added to written HEP along with instructions for ice and elevation.   P: Continue with rehab plan  Katrina Romero, PTA    32 u of 18914 TE, [id number], TA [id number], [id number], [id number]  08 u of 74479  6- to 9- 2/32  units  TE  1/32  Units  TA    Treatment Charges: Mins Units   Initial Evaluation     Re-Evaluation     Ther Exercise         TE 30 2   Manual Therapy     MT     Ther Activities        TA 10 1   Gait Training          GT     Neuro Re-education NR     Modalities     Non-Billable Service Time     Other     Total Time/Units 40 3

## 2021-06-21 ENCOUNTER — TELEPHONE (OUTPATIENT)
Dept: PHYSICAL THERAPY | Age: 31
End: 2021-06-21

## 2021-06-23 ENCOUNTER — TREATMENT (OUTPATIENT)
Dept: PHYSICAL THERAPY | Age: 31
End: 2021-06-23
Payer: MEDICAID

## 2021-06-23 DIAGNOSIS — S83.512A RUPTURE OF ANTERIOR CRUCIATE LIGAMENT OF LEFT KNEE, INITIAL ENCOUNTER: ICD-10-CM

## 2021-06-23 DIAGNOSIS — S82.132A CLOSED FRACTURE OF MEDIAL PORTION OF LEFT TIBIAL PLATEAU, INITIAL ENCOUNTER: Primary | ICD-10-CM

## 2021-06-23 PROCEDURE — 97110 THERAPEUTIC EXERCISES: CPT

## 2021-06-23 NOTE — PROGRESS NOTES
Physical Therapy Daily Treatment Note    Date: 2021  Patient Name: Jacey Marinelli  : 1990   MRN: [id number]  DOInjury: 2021  DOSx: TBD  Referring Provider:  Yasmeen Brooke DO    Medical Diagnosis:   1. Rupture of anterior cruciate ligament of left knee, initial encounter      2. Closed fracture of medial portion of left tibial plateau, initial encounter           Outcome Measure:  Lower Extremity Functional Scale (LEFS) 73% impairment on eval    S: States knee continues to be tight and painful in both flexion and extension. States knee gives out frequently so he wears knee brace and uses crutch. O:  No surgery date scheduled as of yet. Due to fx of tibia. Uses crutch on Left side because he feels more comfortable with it on left side. Time  840- 0920      Visit  3 Repeat outcome measure at mid point and end.     Pain   3/10     ROM  92/94 flex, -18/-20 ext     Modalities       Ice, compression, elevation           Stretching       Patella mobs      Prone hangs      Heel props 2 x 2 min HEP    Prone self flexion stretch            Exercise       Nustep  L5/ 8 min     Heel slides X 25      Quad sets HEP     HS sets HEP     SLR 2 x 10     HS stretch HEP     Knee flex stretch-seated HEP     SAQ HEP     LAQ 2# 2 x 10     HS in sitting with pillow case            Marching  2# x 20     Standing Hip Abduction 2# x 20 L only     Standing Hip Flexion      Standing Hip Extension      Standing HS Curl 2# x 20 L only     Toe Raises      Squats       Sit to [employer] Squats      Split Squats      [facility] Split Squats      Lunges      Eccentric Heel Tap      Terminal Knee Extension with Grey Band      SLS            Functional Activity       Step-ups - FWD       Step-ups - LAT      Step-ups - BWD       Step up and over reciprocally       TG squats      TG Calf raises            Weighted Machines      Leg Press      Toe Raises      Leg Curl       Knee Ext            Marching gait      Side stepping Retrowalk      Lunges      Lateral Lunges      Heel to toe      A: Tolerated well. Knee painful in both end range flexion and extension.   P: Continue with rehab plan  Sujata Senate, PTA    32 u of 62401 TE, [id number],  [id number], [id number], [id number]  08 u of 54751  6- to 9- 2/32  units  TE  1/32  Units  TA    Treatment Charges: Mins Units   Initial Evaluation     Re-Evaluation     Ther Exercise         TE 40 3   Manual Therapy     MT     Ther Activities        TA     Gait Training          GT     Neuro Re-education NR     Modalities     Non-Billable Service Time     Other     Total Time/Units 40 3

## 2021-06-30 ENCOUNTER — OFFICE VISIT (OUTPATIENT)
Dept: ORTHOPEDIC SURGERY | Age: 31
End: 2021-06-30

## 2021-06-30 VITALS — BODY MASS INDEX: 27.28 KG/M2 | TEMPERATURE: 98 F | HEIGHT: 68 IN | WEIGHT: 180 LBS

## 2021-06-30 DIAGNOSIS — S83.512A RUPTURE OF ANTERIOR CRUCIATE LIGAMENT OF LEFT KNEE, INITIAL ENCOUNTER: ICD-10-CM

## 2021-06-30 DIAGNOSIS — S83.512A RUPTURE OF ANTERIOR CRUCIATE LIGAMENT OF LEFT KNEE, INITIAL ENCOUNTER: Primary | ICD-10-CM

## 2021-06-30 DIAGNOSIS — S82.132A CLOSED FRACTURE OF MEDIAL PORTION OF LEFT TIBIAL PLATEAU, INITIAL ENCOUNTER: Primary | ICD-10-CM

## 2021-06-30 PROCEDURE — 99024 POSTOP FOLLOW-UP VISIT: CPT | Performed by: ORTHOPAEDIC SURGERY

## 2021-06-30 NOTE — PATIENT INSTRUCTIONS
Patient Education        Anterior Cruciate Ligament (ACL) Injury: Care Instructions  Overview     An anterior cruciate ligament (ACL) injury is a tear in one of the four knee ligaments that join the upper leg bone to the lower leg bones. The ACL keeps your knee stable when your leg moves forward and backward. It also keeps the knee from bending sideways. A torn ACL often occurs during sports that require stop-and-go or twisting motions. Sports like soccer, football, basketball, and snow skiing are examples. Treatment depends on how badly the ACL is torn. Not all injuries need surgery. Your doctor also will base your treatment on how unstable the knee is, if other parts of your knee are injured, and how active you are. You may be able to wait for a while before you decide whether to have surgery. Follow-up care is a key part of your treatment and safety. Be sure to make and go to all appointments, and call your doctor if you are having problems. It's also a good idea to know your test results and keep a list of the medicines you take. How can you care for yourself at home? · Follow your doctor's directions for wearing a brace or an immobilizer, which limits movement of your knee. Wrapping your knee with an elastic bandage may help reduce or prevent swelling. · Use crutches as directed in the first few days after your injury if your doctor recommends them. · Rest your knee when possible. But try to flex your calf muscles often to help blood circulate in your legs. · Put ice or a cold pack on your knee for 10 to 20 minutes at a time. Try to do this every 1 to 2 hours during the next 3 days (when you are awake) or until the swelling goes down. Put a thin cloth between the ice and your skin. · Prop up your leg on a pillow when you ice it or anytime you sit or lie down during the next 3 days. Try to keep it above the level of your heart. This will help reduce swelling.   · Take pain medicines exactly as with your legs straight and the injured leg on top. Keeping your leg straight, raise your injured leg up and backward about 6 inches. Lower the leg to the starting position. Do 3 sets of 20 repetitions, or if you tire quickly, 3 sets of 8 to 12 repetitions. When should you call for help? Call 911 anytime you think you may need emergency care. For example, call if:    · You have chest pain, are short of breath, or you cough up blood. Call your doctor now or seek immediate medical care if:    · You have new or worse pain.     · Your foot is cool or pale or changes color.     · You have tingling, weakness, or numbness in your toes.     · Your cast or splint feels too tight.     · You have signs of a blood clot in your leg (called a deep vein thrombosis), such as:  ? Pain in your calf, back of the knee, thigh, or groin. ? Redness or swelling in your leg. Watch closely for changes in your health, and be sure to contact your doctor if:    · You have a problem with your splint or cast.     · You do not get better as expected. Where can you learn more? Go to https://Context MatterspeBlacksumaceb.Elixr. org and sign in to your [employer] account. Florence Lazo in the [facility] box to learn more about \"Anterior Cruciate Ligament (ACL) Injury: Care Instructions. \"     If you do not have an account, please click on the \"Sign Up Now\" link. Current as of: November 16, 2020               Content Version: 12.9  © 2006-2021 [employer]. Care instructions adapted under license by [facility] ([facility]). If you have questions about a medical condition or this instruction, always ask your healthcare professional. [place] [address] any warranty or liability for your use of this information.          Patient Education        Anterior Cruciate Ligament Reconstruction: What to Expect at Home  Your Recovery  Anterior cruciate ligament (ACL) surgery replaces the damaged ligament with a new ligament called a graft. In most cases, the graft is a tendon taken from your own knee or hamstring. In some cases, the graft comes from a donor. You will feel tired for several days. Your knee will be swollen. And you may have numbness around the cut (incision) on your knee. Your ankle and shin may be bruised or swollen. You can put ice on the area to reduce swelling. Most of this will go away in a few days. You should soon start seeing improvement in your knee. You may be able to return to most of your regular activities within a few weeks. But it will be several months before you have complete use of your knee. It may take as long as 6 months to a year before your knee is ready for hard physical work or certain sports. Surgery can help. But even after surgery, your knee may not be as strong as it was before the injury. You will need to build your strength and the motion of your joint with rehabilitation (rehab) exercises. How soon you can return to sports or exercise depends on how well you follow your rehab program and how well your knee heals. Your doctor or physical therapist will give you an idea of when you can return to these activities. Most people can jog in about 4 months and run or cycle in about 4 to 6 months. You may need to wear a knee brace when you play sports. This care sheet gives you a general idea about how long it will take for you to recover. But each person recovers at a different pace. Follow the steps below to get better as quickly as possible. How can you care for yourself at home? Activity    · Rest when you feel tired. Getting enough sleep will help you recover. Sleep with your knee raised, but not bent. Put a pillow under your foot. Keep your leg raised as much as you can for the first few days.     · You can use a brace and crutches to move around the house to do daily tasks. Don't put weight on your leg without these until your doctor says it's okay.  Your thigh muscles will be weak, so take your and when you can restart your medicines. He or she will also give you instructions about taking any new medicines.     · If you take aspirin or some other blood thinner, ask your doctor if and when to start taking it again. Make sure that you understand exactly what your doctor wants you to do.     · Take pain medicines exactly as directed. ? If the doctor gave you a prescription medicine for pain, take it as prescribed. ? If you are not taking a prescription pain medicine, ask your doctor if you can take an over-the-counter medicine.     · If your doctor prescribed antibiotics, take them as directed. Do not stop taking them just because you feel better. You need to take the full course of antibiotics.     · If you think your pain medicine is making you sick to your stomach:  ? Take your medicine after meals (unless your doctor has told you not to). ? Ask your doctor for a different pain medicine. Incision care    · If you have a bandage over your incision, keep the bandage clean and dry. Follow your doctor's instructions. Your doctor will probably want you to leave the bandage on until you come back to the office. If your doctor allows it, you may be able to remove the bandage 48 to 72 hours after your surgery.     · If you have strips of tape on the incision, leave the tape on for a week or until it falls off. Keep the area clean and dry. Exercise    · Do your rehab exercises as instructed. Exercise in a rehab program is an important part of your treatment. It will help you improve your knee's range of motion and regain your muscle strength.     · If you are given a continuous passive motion machine, use it as directed. This machine will do some of the exercises for you. You will use it for about 2 weeks. Ice and elevation    · To reduce swelling and pain, put ice or a cold pack on your knee for 10 to 20 minutes at a time. Do this every few hours.  Put a thin cloth between the ice and your skin.     · For \"Anterior Cruciate Ligament Reconstruction: What to Expect at Home. \"     If you do not have an account, please click on the \"Sign Up Now\" link. Current as of: November 16, 2020               Content Version: 12.9  © 2006-2021 [employer]. Care instructions adapted under license by [facility] ([facility]). If you have questions about a medical condition or this instruction, always ask your healthcare professional. [address] any warranty or liability for your use of this information. Patient Education        Anterior Cruciate Ligament Reconstruction: Before Your Surgery  What is anterior cruciate ligament reconstruction? Anterior cruciate ligament (ACL) surgery replaces the damaged ligament with a new ligament called a graft. In most cases, the graft is a tendon taken from your own knee or hamstring. In some cases, the graft comes from a donor. Your doctor uses a lighted tube called an arthroscope, or scope. He or she puts this and other surgical tools through small cuts in your knee. Your doctor may make a larger cut to take the graft from your knee or hamstring. He or she then replaces the ACL with a graft. The cuts are called incisions. They leave scars that usually fade with time. Most people go home on the same day of the surgery or the next day. Your knee will slowly get stronger as you recover. You may be able to go back to most of your normal activities within a few weeks. But it will be months before you have complete use of your knee. It may take as long as 6 months before your knee is ready for hard physical work or certain sports. You will need physical rehabilitation (rehab) after surgery. This will build your strength and improve the motion of your joint. At first, you will get help with the exercises. Later, you will get exercises to do on your own. The rehab will last for several months.  After surgery and rehab, you should have less pain and your knee should be more stable. Your knee should not give out or buckle. How soon you can return to sports or exercise depends on how well you follow your rehab program and how well your knee heals. If you had a partial meniscectomy, you might be able to play sports in about 1 to 2 months. If you had meniscus repair, it may be 3 to 6 months before you can play sports. Follow-up care is a key part of your treatment and safety. Be sure to make and go to all appointments, and call your doctor if you are having problems. It's also a good idea to know your test results and keep a list of the medicines you take. How do you prepare for surgery? Surgery can be stressful. This information will help you understand what you can expect. And it will help you safely prepare for surgery. Preparing for surgery    · You will have a chance to talk to your physical therapist. Physical rehabilitation is a big part of your recovery. Your therapist may teach you some exercises that will help prepare your knee for surgery.     · Be sure you have someone to take you home. Anesthesia and pain medicine will make it unsafe for you to drive or get home on your own.     · Understand exactly what surgery is planned, along with the risks, benefits, and other options.     · Tell your doctor ALL the medicines, vitamins, supplements, and herbal remedies you take. Some may increase the risk of problems during your surgery. Your doctor will tell you if you should stop taking any of them before the surgery and how soon to do it.     · If you take aspirin or some other blood thinner, ask your doctor if you should stop taking it before your surgery. Make sure that you understand exactly what your doctor wants you to do. These medicines increase the risk of bleeding.     · Make sure your doctor and the hospital have a copy of your advance directive. If you don't have one, you may want to prepare one. It lets others know your health care wishes.  It's a good thing to have before any type of surgery or procedure. What happens on the day of surgery? · Follow the instructions exactly about when to stop eating and drinking. If you don't, your surgery may be canceled. If your doctor told you to take your medicines on the day of surgery, take them with only a sip of water.     · Take a bath or shower before you come in for your surgery. Do not apply lotions, perfumes, deodorants, or nail polish.     · Do not shave the surgical site yourself.     · Take off all jewelry and piercings. And take out contact lenses, if you wear them. At the hospital or surgery center   · Bring a picture ID.     · The area for surgery is often marked to make sure there are no errors.     · You will be kept comfortable and safe by your anesthesia provider. The anesthesia may make you sleep. Or it may just numb the area being worked on.     · The surgery will take about 1 to 3 hours.     · Your leg may be in a leg brace to limit motion.     · You may have a device that applies cold treatment to your knee.     · You will have crutches for 1 to 2 weeks. It may help to have a backpack or another way to carry items. When should you call your doctor? · You have questions or concerns.     · You don't understand how to prepare for your surgery.     · You become ill before the surgery (such as fever, flu, or a cold).     · You need to reschedule or have changed your mind about having the surgery. Where can you learn more? Go to https://WorlizeannaCareTree.Julep. org and sign in to your [employer] account. Enter W504 in the [employer] box to learn more about \"Anterior Cruciate Ligament Reconstruction: Before Your Surgery. \"     If you do not have an account, please click on the \"Sign Up Now\" link. Current as of: November 16, 2020               Content Version: 12.9  © 2006-2021 Healthwise, Incorporated. Care instructions adapted under license by [address]  If you have questions about a parts of your knee. These may include ligaments, cartilage, or broken bones. What can you expect after ACL surgery? After you recover, you should have less pain. And your knee should be more stable. How soon you can go back to work depends on your job. If you sit at work, you may be able to go back in 1 to 2 weeks. If you are on your feet at work, it may take 4 to 6 weeks. If you are very physically active in your job, it may take 4 to 6 months. A rehabilitation (rehab) program can help your knee get better faster. · Your rehab may start before surgery. Your doctor or physical therapist may recommend strength and motion exercises to prepare for surgery. · After surgery, you will do exercises as part of your rehab program. This often lasts up to a year. · Rehab is hard work. Many people say it feels like [place] a second job. \"  If you commit to your rehab and work hard, you will see a lot of improvement in a few months. Within a year, you should be able to do all the activities you did before. This includes playing sports. But you may have to wear a brace when you play sports. Follow-up care is a key part of your treatment and safety. Be sure to make and go to all appointments, and call your doctor if you are having problems. It's also a good idea to know your test results and keep a list of the medicines you take. Where can you learn more? Go to https://[employer]ascular.SourceDNA. org and sign in to your [employer] account. Enter B950 in the [employer] box to learn more about \"Learning About Anterior Cruciate Ligament Reconstruction Surgery. \"     If you do not have an account, please click on the \"Sign Up Now\" link. Current as of: November 16, 2020               Content Version: 12.9  © 2006-2021 Healthwise, Incorporated. Care instructions adapted under license by [facility] ([facility]).  If you have questions about a medical condition or this instruction, always ask your healthcare professional. [employer] disclaims any warranty or liability for your use of this information.

## 2021-06-30 NOTE — PROGRESS NOTES
Chief Complaint   Patient presents with    Knee Pain     Left knee pain continues. Felt like it was getting better but the more he uses it the more painful it becomes. HPI:    Patient is 27 y.o. male complaining chronic, atraumatic, insidious onset left knee pain for several weeks after feeling a pop getting out of the car. He admits to stiffness, deep, aching pain, swelling, difficulty with stairs and ambulating far distances. He admits to gross instability specifically in his Left knee. Previous treatments include rest, ice, and anti-inflammatory medication and HEP without much relief. Follows up after starting physical therapy. Patient states that he is unimproved pain control but still feels somewhat tight. ROS:    Skin: (-) rash,(-) psoriasis,(-) eczema, (-)skin cancer. Neurologic: (-)numbness, (-)tingling, (-)headaches, (-) LOC. Cardiovascular: (-) Chest pain, (-) swelling in legs/feet, (-) SOB, (-) cramping in legs/feet with walking. All other review of systems negative except stated above or in HPI      No past medical history on file. Past Surgical History:   Procedure Laterality Date    FRACTURE SURGERY         Current Outpatient Medications:     naproxen (NAPROSYN) 500 MG tablet, Take 1 tablet by mouth 2 times daily (with meals), Disp: 60 tablet, Rfl: 0    ibuprofen (ADVIL;MOTRIN) 800 MG tablet, Take 1 tablet by mouth every 6 hours as needed for Pain, Disp: 21 tablet, Rfl: 0    naproxen (NAPROSYN) 500 MG tablet, Take 1 tablet by mouth 2 times daily for 7 days, Disp: 14 tablet, Rfl: 0    buprenorphine-naloxone (SUBOXONE) 8-2 MG FILM SL film, Place 2 Film under the tongue daily. 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  Sexual activity: Yes     Partners: Female   Other Topics Concern    Not on file   Social History Narrative    Not on file     Social Determinants of Health     Financial Resource Strain:     Difficulty of Paying Living Expenses:    Food Insecurity:     Worried About Running Out of Food in the Last Year:     [address] in the Last Year:    Transportation Needs:     Lack of Transportation (Medical):  Lack of Transportation (Non-Medical):    Physical Activity:     Days of Exercise per Week:     Minutes of Exercise per Session:    Stress:     Feeling of Stress :    Social Connections:     Frequency of Communication with Friends and Family:     Frequency of Social Gatherings with Friends and Family:     Attends Sikh Services:     Active Member of Clubs or Organizations:     Attends Club or Organization Meetings:     Marital Status:    Intimate Partner Violence:     Fear of Current or Ex-Partner:     Emotionally Abused:     Physically Abused:     Sexually Abused:      No family history on file. Physical Exam:    Temp 98 °F (36.7 °C)   Ht 5' 8\" (1.727 m)   Wt 180 lb (81.6 kg)   BMI 27.37 kg/m²     GENERAL: alert, appears stated age, cooperative, no acute distress    HEENT: Head is normocephalic, atraumatic. PERRLA. SKIN: Clean, dry, intact. There is not any cellulitis or cutaneous lesions noted in the lower extremities except noted below in MSK    PULMONARY: breathing is regular and unlabored, no acute distress    CV: The bilateral upper and lower extremities are warm and well-perfused with brisk capillary refill.  2+ pulses UE and LE bilateral.     PSYCHIATRY: Pleasant mood, initial encounter  -     XR KNEE LEFT (MIN 4 VIEWS); Future        Patient seen and examined. X-rays reviewed. Patient has little to no arthritis noted on x-rays today. However patient's main complaint is instability of symptomatic knee. Exam and history is consistent with possible meniscus tear. MRI recommended for further evaluation management. Patient seen and examined. MRI reviewed with patient in detail. Natural history and course discussed with patient in long discussion  Treatment options discussed with patient in detail including risks and benefits. Patient should do well with conservative management as patient would like to avoid surgery at this time regards to the medial plateau fracture. Patient will continue with rehabilitation for his ACL and range of motion to avoid arthrofibrosis. Follow-up in 2 to 4 weeks for recheck.         Raphael Hernandez,

## 2021-07-07 ENCOUNTER — OFFICE VISIT (OUTPATIENT)
Dept: ORTHOPEDIC SURGERY | Age: 31
End: 2021-07-07
Payer: MEDICAID

## 2021-07-07 VITALS — BODY MASS INDEX: 27.28 KG/M2 | HEIGHT: 68 IN | WEIGHT: 180 LBS

## 2021-07-07 DIAGNOSIS — S83.512A RUPTURE OF ANTERIOR CRUCIATE LIGAMENT OF LEFT KNEE, INITIAL ENCOUNTER: Primary | ICD-10-CM

## 2021-07-07 DIAGNOSIS — Z77.22 TOBACCO SMOKE EXPOSURE: ICD-10-CM

## 2021-07-07 DIAGNOSIS — Z71.82 EXERCISE COUNSELING: ICD-10-CM

## 2021-07-07 PROCEDURE — 4004F PT TOBACCO SCREEN RCVD TLK: CPT | Performed by: ORTHOPAEDIC SURGERY

## 2021-07-07 PROCEDURE — 99214 OFFICE O/P EST MOD 30 MIN: CPT | Performed by: ORTHOPAEDIC SURGERY

## 2021-07-07 PROCEDURE — G8427 DOCREV CUR MEDS BY ELIG CLIN: HCPCS | Performed by: ORTHOPAEDIC SURGERY

## 2021-07-07 PROCEDURE — G8419 CALC BMI OUT NRM PARAM NOF/U: HCPCS | Performed by: ORTHOPAEDIC SURGERY

## 2021-07-07 PROCEDURE — 99406 BEHAV CHNG SMOKING 3-10 MIN: CPT | Performed by: ORTHOPAEDIC SURGERY

## 2021-07-07 NOTE — PATIENT INSTRUCTIONS
with your legs straight and the injured leg on top. Keeping your leg straight, raise your injured leg up and backward about 6 inches. Lower the leg to the starting position. Do 3 sets of 20 repetitions, or if you tire quickly, 3 sets of 8 to 12 repetitions. When should you call for help? Call 911 anytime you think you may need emergency care. For example, call if:    · You have chest pain, are short of breath, or you cough up blood. Call your doctor now or seek immediate medical care if:    · You have new or worse pain.     · Your foot is cool or pale or changes color.     · You have tingling, weakness, or numbness in your toes.     · Your cast or splint feels too tight.     · You have signs of a blood clot in your leg (called a deep vein thrombosis), such as:  ? Pain in your calf, back of the knee, thigh, or groin. ? Redness or swelling in your leg. Watch closely for changes in your health, and be sure to contact your doctor if:    · You have a problem with your splint or cast.     · You do not get better as expected. Where can you learn more? Go to [email or web address]://Urban [email or web address].LocalVox [email or web address] org and sign in to your SeekSherpa account. Yolanda Gonzalez in the [facility] box to learn more about \"Anterior Cruciate Ligament (ACL) Injury: Care Instructions. \"     If you do not have an account, please click on the \"Sign Up Now\" link. Current as of: November 16, 2020               Content Version: 12.9  © 2006-2021 [employer]. Care instructions adapted under license by [facility] ([facility]). If you have questions about a medical condition or this instruction, always ask your healthcare professional. [place] [address] any warranty or liability for your use of this information.          Patient Education        Anterior Cruciate Ligament Reconstruction: What to Expect at Home  Your Recovery  Anterior cruciate ligament (ACL) surgery replaces the damaged ligament with a new ligament called a graft. In most cases, the graft is a tendon taken from your own knee or hamstring. In some cases, the graft comes from a donor. You will feel tired for several days. Your knee will be swollen. And you may have numbness around the cut (incision) on your knee. Your ankle and shin may be bruised or swollen. You can put ice on the area to reduce swelling. Most of this will go away in a few days. You should soon start seeing improvement in your knee. You may be able to return to most of your regular activities within a few weeks. But it will be several months before you have complete use of your knee. It may take as long as 6 months to a year before your knee is ready for hard physical work or certain sports. Surgery can help. But even after surgery, your knee may not be as strong as it was before the injury. You will need to build your strength and the motion of your joint with rehabilitation (rehab) exercises. How soon you can return to sports or exercise depends on how well you follow your rehab program and how well your knee heals. Your doctor or physical therapist will give you an idea of when you can return to these activities. Most people can jog in about 4 months and run or cycle in about 4 to 6 months. You may need to wear a knee brace when you play sports. This care sheet gives you a general idea about how long it will take for you to recover. But each person recovers at a different pace. Follow the steps below to get better as quickly as possible. How can you care for yourself at home? Activity    · Rest when you feel tired. Getting enough sleep will help you recover. Sleep with your knee raised, but not bent. Put a pillow under your foot. Keep your leg raised as much as you can for the first few days.     · You can use a brace and crutches to move around the house to do daily tasks. Don't put weight on your leg without these until your doctor says it's okay.  Your thigh muscles will be weak, so take your time and be safe. You will have the crutches for 1 to 2 weeks.     · If you have a brace, leave it on except when you exercise your knee or you shower. (Not all doctors use braces.) Be careful not to put the brace on too tight. You will probably need to use it for 2 to 6 weeks.     · For about 12 weeks, do not do any strenuous activity. This includes not only sports, but also things like mowing lawns, raking leaves, and shoveling snow.     · You may shower 24 to 48 hours after surgery, if your doctor okays it. When you shower, keep your bandage and incision dry by taping a sheet of plastic to cover them. It might be best to get a shower stool to sit on. If you have a brace, only take it off if your doctor says it's okay.     · If your doctor does not want you to shower or remove your brace, you can take a sponge bath.     · Do not take a bath, swim, use a hot tub, or soak your leg for 2 weeks or until your doctor says it's okay.     · You can drive when you are no longer using crutches or a knee brace, are no longer taking prescription pain medicine, and have some control over your knee. For most people, this takes 1 to 2 weeks.     · How soon you can go back to work depends on your job. If you sit at work, you may be able to go back in 1 to 2 weeks. But if you are on your feet at work, it may take 4 to 6 weeks. If you are very physically active in your job, it may take 4 to 6 months. Diet    · You can eat your normal diet. If your stomach is upset, try bland, low-fat foods like plain rice, broiled chicken, toast, and yogurt. Drink plenty of fluids.     · You may notice that your bowel movements are not regular right after your surgery. This is common. Try to avoid constipation and straining with bowel movements. You may want to take a fiber supplement every day. If you have not had a bowel movement after a couple of days, ask your doctor about taking a mild laxative.    Medicines    · Your doctor will tell you if and when you can restart your medicines. He or she will also give you instructions about taking any new medicines.     · If you take aspirin or some other blood thinner, ask your doctor if and when to start taking it again. Make sure that you understand exactly what your doctor wants you to do.     · Take pain medicines exactly as directed. ? If the doctor gave you a prescription medicine for pain, take it as prescribed. ? If you are not taking a prescription pain medicine, ask your doctor if you can take an over-the-counter medicine.     · If your doctor prescribed antibiotics, take them as directed. Do not stop taking them just because you feel better. You need to take the full course of antibiotics.     · If you think your pain medicine is making you sick to your stomach:  ? Take your medicine after meals (unless your doctor has told you not to). ? Ask your doctor for a different pain medicine. Incision care    · If you have a bandage over your incision, keep the bandage clean and dry. Follow your doctor's instructions. Your doctor will probably want you to leave the bandage on until you come back to the office. If your doctor allows it, you may be able to remove the bandage 48 to 72 hours after your surgery.     · If you have strips of tape on the incision, leave the tape on for a week or until it falls off. Keep the area clean and dry. Exercise    · Do your rehab exercises as instructed. Exercise in a rehab program is an important part of your treatment. It will help you improve your knee's range of motion and regain your muscle strength.     · If you are given a continuous passive motion machine, use it as directed. This machine will do some of the exercises for you. You will use it for about 2 weeks. Ice and elevation    · To reduce swelling and pain, put ice or a cold pack on your knee for 10 to 20 minutes at a time. Do this every few hours.  Put a thin cloth between the ice and your skin.     · For 3 days after surgery, prop up the sore leg on a pillow when you ice it or anytime you sit or lie down. Try to keep it above the level of your heart. This will help reduce swelling.     · If your doctor gave you support stockings, wear them as long as you are told to. These help prevent blood clots. Follow-up care is a key part of your treatment and safety. Be sure to make and go to all appointments, and call your doctor if you are having problems. It's also a good idea to know your test results and keep a list of the medicines you take. When should you call for help? Call 911 anytime you think you may need emergency care. For example, call if:    · You passed out (lost consciousness).     · You have chest pain, are short of breath, or you cough up blood. Call your doctor now or seek immediate medical care if:    · You have pain that does not get better after you take pain medicine.     · You have tingling, weakness, or numbness in your foot or toes.     · Your cast or splint feels too tight.     · Your foot is cool or pale or changes color.     · You are sick to your stomach or cannot drink fluids.     · You have loose stitches, or your incision comes open.     · You have signs of a blood clot in your leg (called a deep vein thrombosis), such as:  ? Pain in your calf, back of the knee, thigh, or groin. ? Redness or swelling in your leg.     · You have signs of infection, such as:  ? Increased pain, swelling, warmth, or redness. ? Red streaks leading from the incision. ? Pus draining from the incision. ? A fever.     · You bleed through your bandage. Watch closely for any changes in your health, and be sure to contact your doctor if:    · You have a problem with your cast or splint.     · You are not getting better as expected. Where can you learn more? Go to https://yahaira.Sangamo BioSciences. org and sign in to your Scivantage account.  Enter Y225 in the [employer] box to learn more about \"Anterior Cruciate Ligament Reconstruction: What to Expect at Home. \"     If you do not have an account, please click on the \"Sign Up Now\" link. Current as of: November 16, 2020               Content Version: 12.9  © 2006-2021 Search to Phone. Care instructions adapted under license by [facility] ([facility]). If you have questions about a medical condition or this instruction, always ask your healthcare professional. [address] any warranty or liability for your use of this information. Patient Education        Anterior Cruciate Ligament Reconstruction: Before Your Surgery  What is anterior cruciate ligament reconstruction? Anterior cruciate ligament (ACL) surgery replaces the damaged ligament with a new ligament called a graft. In most cases, the graft is a tendon taken from your own knee or hamstring. In some cases, the graft comes from a donor. Your doctor uses a lighted tube called an arthroscope, or scope. He or she puts this and other surgical tools through small cuts in your knee. Your doctor may make a larger cut to take the graft from your knee or hamstring. He or she then replaces the ACL with a graft. The cuts are called incisions. They leave scars that usually fade with time. Most people go home on the same day of the surgery or the next day. Your knee will slowly get stronger as you recover. You may be able to go back to most of your normal activities within a few weeks. But it will be months before you have complete use of your knee. It may take as long as 6 months before your knee is ready for hard physical work or certain sports. You will need physical rehabilitation (rehab) after surgery. This will build your strength and improve the motion of your joint. At first, you will get help with the exercises. Later, you will get exercises to do on your own. The rehab will last for several months.  After surgery and rehab, you should have less pain and your knee should be more stable. Your knee should not give out or buckle. How soon you can return to sports or exercise depends on how well you follow your rehab program and how well your knee heals. If you had a partial meniscectomy, you might be able to play sports in about 1 to 2 months. If you had meniscus repair, it may be 3 to 6 months before you can play sports. Follow-up care is a key part of your treatment and safety. Be sure to make and go to all appointments, and call your doctor if you are having problems. It's also a good idea to know your test results and keep a list of the medicines you take. How do you prepare for surgery? Surgery can be stressful. This information will help you understand what you can expect. And it will help you safely prepare for surgery. Preparing for surgery    · You will have a chance to talk to your physical therapist. Physical rehabilitation is a big part of your recovery. Your therapist may teach you some exercises that will help prepare your knee for surgery.     · Be sure you have someone to take you home. Anesthesia and pain medicine will make it unsafe for you to drive or get home on your own.     · Understand exactly what surgery is planned, along with the risks, benefits, and other options.     · Tell your doctor ALL the medicines, vitamins, supplements, and herbal remedies you take. Some may increase the risk of problems during your surgery. Your doctor will tell you if you should stop taking any of them before the surgery and how soon to do it.     · If you take aspirin or some other blood thinner, ask your doctor if you should stop taking it before your surgery. Make sure that you understand exactly what your doctor wants you to do. These medicines increase the risk of bleeding.     · Make sure your doctor and the hospital have a copy of your advance directive. If you don't have one, you may want to prepare one. It lets others know your health care wishes.  It's a good thing to have before any type of surgery or procedure. What happens on the day of surgery? · Follow the instructions exactly about when to stop eating and drinking. If you don't, your surgery may be canceled. If your doctor told you to take your medicines on the day of surgery, take them with only a sip of water.     · Take a bath or shower before you come in for your surgery. Do not apply lotions, perfumes, deodorants, or nail polish.     · Do not shave the surgical site yourself.     · Take off all jewelry and piercings. And take out contact lenses, if you wear them. At the hospital or surgery center   · Bring a picture ID.     · The area for surgery is often marked to make sure there are no errors.     · You will be kept comfortable and safe by your anesthesia provider. The anesthesia may make you sleep. Or it may just numb the area being worked on.     · The surgery will take about 1 to 3 hours.     · Your leg may be in a leg brace to limit motion.     · You may have a device that applies cold treatment to your knee.     · You will have crutches for 1 to 2 weeks. It may help to have a backpack or another way to carry items. When should you call your doctor? · You have questions or concerns.     · You don't understand how to prepare for your surgery.     · You become ill before the surgery (such as fever, flu, or a cold).     · You need to reschedule or have changed your mind about having the surgery. Where can you learn more? Go to https://Ausra.Krillion. org and sign in to your Moovly account. Enter P381 in the Appiphany box to learn more about \"Anterior Cruciate Ligament Reconstruction: Before Your Surgery. \"     If you do not have an account, please click on the \"Sign Up Now\" link. Current as of: November 16, 2020               Content Version: 12.9  © 2826-5466 Healthwise, Incorporated. Care instructions adapted under license by [facility] ([facility]).  If you have questions about a medical condition or this instruction, always ask your healthcare professional. [address] any warranty or liability for your use of this information. Patient Education        Learning About Anterior Cruciate Ligament Reconstruction Surgery  What is anterior cruciate ligament surgery? The anterior cruciate ligament (ACL) is one of four knee ligaments that connect the upper leg bone (femur) with the lower leg bone (tibia). When you injure or tear your ACL, your knee is less stable. It may \"give out\" when you do not expect it and then become stiff and swollen. Surgery can help your knee feel better. But in some cases, the knee may not be as strong as before it was injured. This type of surgery replaces your damaged ACL with a new one. The new one is called a graft. Most of the time, the graft is a tendon taken from your own knee or hamstring. But it may come from a donor. After surgery, most people can return to their normal activities. How is ACL surgery done? Your doctor makes several small cuts (incisions) around the knee. He or she then puts a lighted tube, called an arthroscope or scope, into one of the incisions. A camera at the end of the scope sends pictures from inside the knee to a TV monitor in the operating room. The doctor watches the monitor to do the surgery. The doctor puts surgical tools through the other incisions. He or she drills small holes into the upper and lower leg bones where these bones come close together at the knee joint. The holes form tunnels for the graft. The doctor takes the graft and pulls it through these tunnels. Then the doctor attaches the graft with screws or staples. Next the doctor closes the incisions with stitches or tape. And you may get a temporary surgical drain placed in your knee. You will not feel pain during the surgery. You will get medicines to make you sleep or to numb your leg.   Your doctor also may repair other injured parts of your knee. These may include ligaments, cartilage, or broken bones. What can you expect after ACL surgery? After you recover, you should have less pain. And your knee should be more stable. How soon you can go back to work depends on your job. If you sit at work, you may be able to go back in 1 to 2 weeks. If you are on your feet at work, it may take 4 to 6 weeks. If you are very physically active in your job, it may take 4 to 6 months. A rehabilitation (rehab) program can help your knee get better faster. · Your rehab may start before surgery. Your doctor or physical therapist may recommend strength and motion exercises to prepare for surgery. · After surgery, you will do exercises as part of your rehab program. This often lasts up to a year. · Rehab is hard work. Many people say it feels like [place] a second job. \"  If you commit to your rehab and work hard, you will see a lot of improvement in a few months. Within a year, you should be able to do all the activities you did before. This includes playing sports. But you may have to wear a brace when you play sports. Follow-up care is a key part of your treatment and safety. Be sure to make and go to all appointments, and call your doctor if you are having problems. It's also a good idea to know your test results and keep a list of the medicines you take. Where can you learn more? Go to https://Placemeter.Monitor My [email or web address] org and sign in to your Opez account. Enter B950 in the [employer] box to learn more about \"Learning About Anterior Cruciate Ligament Reconstruction Surgery. \"     If you do not have an account, please click on the \"Sign Up Now\" link. Current as of: November 16, 2020               Content Version: 12.9  © 2006-2021 Healthwise, Incorporated. Care instructions adapted under license by [facility] ([facility]).  If you have questions about a medical condition or this instruction, always ask your healthcare professional. [employer], [facility] disclaims any warranty or liability for your use of this information. Patient Education        Anterior Cruciate Ligament (ACL): Rehab Exercises  Introduction  Here are some examples of exercises for you to try. The exercises may be suggested for a condition or for rehabilitation. Start each exercise slowly. Ease off the exercises if you start to have pain. You will be told when to start these exercises and which ones will work best for you. How to do the exercises  Quad sets   1. Sit with your leg straight and supported on the floor or a firm bed. (If you feel discomfort in the front or back of your knee, place a small towel roll under your knee.)  2. Tighten the muscles on top of your thigh by pressing the back of your knee flat down to the floor. (If you feel discomfort under your kneecap, place a small towel roll under your knee.)  3. Hold for about 6 seconds, then rest for up to 10 seconds. 4. Do 8 to 12 repetitions several times a day. Straight-leg raises to the front   1. Lie on your back with your good knee bent so that your foot rests flat on the floor. Your injured leg should be straight. Make sure that your low back has a normal curve. You should be able to slip your flat hand in between the floor and the small of your back, with your palm touching the floor and your back touching the back of your hand. 2. Tighten the thigh muscles in the injured leg by pressing the back of your knee flat down to the floor. Hold your knee straight. 3. Keeping the thigh muscles tight, lift your injured leg up so that your heel is about 12 inches off the floor. Hold for about 6 seconds, and then lower slowly. 4. Do 8 to 12 repetitions, 3 times a day. Straight-leg raises to the outside   1. Lie on your side, with your injured leg on top. 2. Tighten the front thigh muscles of your injured leg to keep your knee straight.   3. Keep your hip and your leg straight in line with the rest of your body, and keep your knee pointing forward. Do not drop your hip back. 4. Lift your injured leg straight up toward the ceiling, about 12 inches off the floor. Hold for about 6 seconds, then slowly lower your leg. 5. Do 8 to 12 repetitions. Heel dig bridging   1. Lie on your back with both knees bent and your ankles bent so that only your heels are digging into the floor. Your knees should be bent about 90 degrees. 2. Then push your heels into the floor, squeeze your buttocks, and lift your hips off the floor until your shoulders, hips, and knees are all in a straight line. 3. Hold for about 6 seconds as you continue to breathe normally, and then slowly lower your hips back down to the floor and rest for up to 10 seconds. 4. Do 8 to 12 repetitions. Hamstring curls   1. Lie on your stomach with your knees straight. If your kneecap is uncomfortable, roll up a washcloth and put it under your leg just above your kneecap. 2. Lift the foot of your injured leg by bending the knee so that you bring the foot up toward your buttock. If this motion hurts, try it without bending your knee quite as far. This may help you avoid any painful motion. 3. Slowly lower your leg back to the floor. 4. Do 8 to 12 repetitions. 5. With permission from your doctor or physical therapist, you may also want to add a cuff weight to your ankle (not more than 5 pounds) or use soup cans in a plastic bag with the loops around your ankle. With weight, you do not have to lift your leg more than 12 inches to get a hamstring workout. Shallow standing knee bends   Do this exercise only if you have very little pain; if you have no clicking, locking, or giving way in the injured knee; and if it does not hurt while you are doing 8 to 12 repetitions. 1. Stand with your hands lightly resting on a counter or chair in front of you. Put your feet shoulder-width apart.   2. Slowly bend your knees so that you squat down like you are going to sit in a chair. Make sure that your knees do not go in front of your toes. 3. Lower yourself about 6 inches. Your heels should remain on the floor at all times. 4. Rise slowly to a standing position. Heel raises   1. Stand with your feet a few inches apart, with your hands lightly resting on a counter or chair in front of you. 2. Slowly raise your heels off the floor while keeping your knees straight. 3. Hold for about 6 seconds, then slowly lower your heels to the floor. 4. Do 8 to 12 repetitions several times during the day. Follow-up care is a key part of your treatment and safety. Be sure to make and go to all appointments, and call your doctor if you are having problems. It's also a good idea to know your test results and keep a list of the medicines you take. Where can you learn more? Go to [email or web address] org and sign in to your [employer] account. Enter D579 in the [employer] box to learn more about \"Anterior Cruciate Ligament (ACL): Rehab Exercises. \"     If you do not have an account, please click on the \"Sign Up Now\" link. Current as of: November 16, 2020               Content Version: 12.9  © 2006-2021 Healthwise, Incorporated. Care instructions adapted under license by [facility] ([place]). If you have questions about a medical condition or this instruction, always ask your healthcare professional. [place] [address] any warranty or liability for your use of this information.

## 2021-07-07 NOTE — PROGRESS NOTES
Chief Complaint   Patient presents with    Knee Pain     Left knee follow up. ROM check       HPI:    Patient is 27 y.o. male complaining chronic, atraumatic, insidious onset left knee pain for several weeks after feeling a pop getting out of the car. He admits to stiffness, deep, aching pain, swelling, difficulty with stairs and ambulating far distances. He admits to gross instability specifically in his Left knee. Previous treatments include rest, ice, and anti-inflammatory medication and HEP without much relief. Follows up after MRI and range of motion check. ROS:    Skin: (-) rash,(-) psoriasis,(-) eczema, (-)skin cancer. Neurologic: (-)numbness, (-)tingling, (-)headaches, (-) LOC. Cardiovascular: (-) Chest pain, (-) swelling in legs/feet, (-) SOB, (-) cramping in legs/feet with walking. All other review of systems negative except stated above or in HPI      No past medical history on file. Past Surgical History:   Procedure Laterality Date    FRACTURE SURGERY         Current Outpatient Medications:     naproxen (NAPROSYN) 500 MG tablet, Take 1 tablet by mouth 2 times daily (with meals), Disp: 60 tablet, Rfl: 0    ibuprofen (ADVIL;MOTRIN) 800 MG tablet, Take 1 tablet by mouth every 6 hours as needed for Pain, Disp: 21 tablet, Rfl: 0    naproxen (NAPROSYN) 500 MG tablet, Take 1 tablet by mouth 2 times daily for 7 days, Disp: 14 tablet, Rfl: 0    buprenorphine-naloxone (SUBOXONE) 8-2 MG FILM SL film, Place 2 Film under the tongue daily. ., Disp: , Rfl:   No Known Allergies  Social History     Socioeconomic History    Marital status: Single     Spouse name: Not on file    Number of children: Not on file    Years of education: Not on file    Highest education level: Not on file   Occupational History    Not on file   Tobacco Use    Smoking status: Current Every Day Smoker     Packs/day: 1.00     Types: Cigarettes    Smokeless tobacco: Current User   Vaping Use    Vaping Use: Some days foot dorsiflexion and plantarflexion grossly intact 5/5. LYMPH: No lymphedema present distally in upper or lower extremity. MUSCULOSKELETAL:    Left knee Exam:    mild effusion noted. No erythema/induration/fluctuance. Posterior medial joint line TTP. Stable to varus and valgus at 0 and 30 degrees of flexion. Negative Lachman's and posterior drawer. Negative patellar grind test and J sign. Compartments soft and compressible throughout leg. Active range of motion 0-120 with pain. Positive Carlie's positive Apley's, gait is antalgic        Imaging:  XR KNEE LEFT (3 VIEWS)    Result Date: 5/30/2021  EXAMINATION: THREE XRAY VIEWS OF THE LEFT KNEE 5/30/2021 2:47 pm COMPARISON: None. HISTORY: ORDERING SYSTEM PROVIDED HISTORY: pain TECHNOLOGIST PROVIDED HISTORY: Reason for exam:->pain FINDINGS: Alignment appears normal.  Sclerotic density is seen in the proximal tibia, likely bone island. There is a subtle lucency seen through the medial patellar facet on the sunrise view. Small joint effusion is seen     Subtle lucency is seen through the medial patellar facet. Correlate with point tenderness as this could represent nondisplaced patellar fracture Small joint effusion. MRI KNEE LEFT WO CONTRAST    Result Date: 6/4/2021  EXAMINATION: MRI OF THE LEFT KNEE WITHOUT CONTRAST, 6/4/2021 1:47 pm TECHNIQUE: Multiplanar multisequence MRI of the left knee was performed without the administration of intravenous contrast. COMPARISON: Left knee x-rays 05/30/2021 HISTORY: ORDERING SYSTEM PROVIDED HISTORY: Contusion of left knee, initial encounter FINDINGS: MENISCI: Medial and lateral menisci appear intact and normal in morphology. No parameniscal cysts are seen. CRUCIATE LIGAMENTS: ACL is ruptured. PCL appears intact. EXTENSOR MECHANISM: Quadriceps and patellar tendons appear intact and unremarkable. Medial and lateral patellar retinacula appear intact.  LATERAL COLLATERAL LIGAMENT COMPLEX: Lateral collateral ligament complex appears intact. There is edema noted about the components of the lateral collateral ligament complex without abnormality noted to the complex itself. Popliteus tendon appears intact. Mild edema in the popliteus muscle. MEDIAL COLLATERAL LIGAMENT COMPLEX: Medial collateral ligament appears intact. There is mild subcutaneous edema about the MCL. KNEE JOINT: Large knee joint effusion. Trace fat fluid level. No intra-articular loose bodies noted. No significant Baker's cyst. No degenerative changes are noted. No focal full-thickness chondral defects or osteochondral lesions. BONE MARROW: Nondepressed and nondisplaced subchondral fracture to the posterior aspect of the medial and tibial plateau with associated mild bone marrow edema. Mild bone marrow edema to the posterior aspect of the lateral tibial plateau without fracture line. No suspicious focal bony lesions. Rupture of the ACL. Nondisplaced and nondepressed small subchondral fracture to the posterior aspect of the medial tibial plateau with associated bone marrow edema. Likely bone marrow contusion to the posterior aspect of the lateral tibial plateau without fracture line. Large joint effusion with trace fat fluid level. Low-grade sprains of the lateral collateral ligament complex and medial collateral ligament. Low-grade strain/contusion of the popliteus muscle. The findings were sent to the Radiology Results [address] at 2:20 pm on 6/4/2021to be communicated to caregiver's office. Ava Weir was seen today for knee pain. Diagnoses and all orders for this visit:    Rupture of anterior cruciate ligament of left knee, initial encounter    Exercise counseling    Tobacco smoke exposure        Patient seen and examined. X-rays reviewed. Patient has little to no arthritis noted on x-rays today. However patient's main complaint is instability of symptomatic knee.   Exam and history is consistent with possible meniscus tear.  MRI recommended for further evaluation management. Patient seen and examined. MRI reviewed with patient in detail. Natural history and course discussed with patient in long discussion  Treatment options discussed with patient in detail including risks and benefits. The risks and benefits of the surgery were discussed with the patient. The risks are including but not limited to: infection, injuries to blood vessels and nerves, non relief of symptoms, arthrofibrosis of knee, need for further operative intervention, blood loss, PE/DVT, MI and death. The risks are understood by the patient and they wish to proceed with a Left knee arthroscopy, anterior cruciate ligament reconstruction,  possible menisectomy with debridement. The patient was counseled at length about the risks of jason Covid-19 during their perioperative period and any recovery window from their procedure. The patient was made aware that jason Covid-19  may worsen their prognosis for recovering from their procedure  and lend to a higher morbidity and/or mortality risk. All material risks, benefits, and reasonable alternatives including postponing the procedure were discussed. The patient does wish to proceed with the procedure at this time. Patient educated about the healing rates with this condition. Patient does smoke and does have secondhand smoke exposure. In this discussion of approximately 5 minutes, patient was counseled about decrease in smoking exposure regards to healing and overall good health. Patient seems reluctant but is willing to listen to the discussion in detail. He states that he will try to cut down as much as possible and states that he will try to quit completely by the next visit. In a 15 minute assessment and discussion, patient was counseled on continued weight loss, diet, and physical activity relating to this condition.  He was educated with options in detail including

## 2021-07-11 NOTE — DISCHARGE INSTR - ACTIVITY
REHABILITATION PROTOCOL: ACL REHABILITATION PROTOCOL       I. Immediate Post-Op          A. Knee brace locked at 0 °        B. Cryo cuff/ Ice       C. Patient instructed in performance of ROM exercises: 2-3 times daily            1. Towel extension: obtain passive extension at least 30 minutes by propping                     heel up on pillows. 2. Passive flexion - off side of bed. 3. Use Good leg assistance for passive ROM out of brace. 4. Prone extension stretch        D. Isometric hamstrings        E. Isometric quads at > 45°. Quad sets, SLR at 0°        F. SLR for quad control        G. Co-contractions        H. Ankle pumps        I. Weight bearing as tolerated with crutches (except meniscal repair)        J. Meniscal Repair Precautions:             1. Non-weight bearing for 4-6 weeks             2. Limit flexion to 90° for first 4 weeks     II. Days 3-4 to 2-3 weeks          A. CPM for home use if/when prescribed ( 2 hour sessions, 3 x per day- slow speed             for 2 weeks) Start at the degrees of flexion obtained in hospital. Progressive                    increase in increments of flexion. Increase 10° increments per day up to 90°, then           increase in 5° increments as tolerated. 1. Goal: 0-100 of flexion (emphasis on full extension)        B. Outpatient P.T.: 3 times per week        C. Continue cyrocuff as needed        D. Home exercises: 3 times per day             1. Towel extensions             2. Prone passive stretching (if necessary for extension)             3. Wall & heel slides for flexion (or comparable flexion exercises)             4. Active assisted knee flexion only             5. Good leg assist passive extension             6. Isotonic hamstrings             7. Open chain quads 90° - 45° (if tolerated)        E. WB as tolerated with crutches (unless meniscal repair)        F.  Brace at 0° for ambulating and sleeping G. Cryocuff after exercises or if swelling occurs        H. EMS Protocol: High Voltage, co-contractions     III. Weeks 3-6          A. Fit with Protective De-rotation Brace (0-100° minimum): week 2-4: depending on              individual case. Exception: Meniscal repair stays in [place] for 6 weeks. Mary Kate Mccarthy down on one crutch or cane        C. EMS Protocol        D. Continue ROM exercises as before with home program        E. Water Conditioning (can start earlier if there is complete wound healing)        F. Swimming- well leg kick only        G. Stationary Biking, Stairmaster, ¼ Squats        H. Start easy closed-chain quad exercises        I. Isotonic Hamstring        J. Quads 90-30 (if no patellar irritation)        K. Stress Full Extension. Should have 120° of flexion by 6 weeks     IV. Weeks 6-12          A. Most strengthening exercises for quadriceps are to be closed kinetic chain          exercises             1. Bilateral knee bends; progress to unilateral knee bends             2. Calf raises (double to single leg)             3. Leg press machine (90- 10)             4. One leg balancing        B. Brace for high risk situations only        C. Wean off crutch or cane        D. Stationary Biking (for ROM first, then gradually add resistance)        E. Continue heel slides for flexion at home        F. Passive flexion stretch        G. Water conditioning        H. Swimming (flutter kick only)        I. Increase hamstrings        J. Strengthening        K. If quad strength > 70% - rope jumping ( with brace ), leg press, hip slide, squat          rack (90- 10), stairmaster        L. Discontinue De-rotation brace for ADLs     V. 3-5 Months          A. Cycling & swimming (progress to road bike)        B. Isokinetics at 5-6 Months with extension stop of 20 (no low angular velocities)        C. Jogging at 3-4 months (if quad strength > 70 %)        D.  Proprioception exercises (balance board)        E. Leg Press        F. Strength Test, KT - 1000. If > 70% strength, lateral shuffles, cariocas, jumping                rope, isotonics with 20 extension stop        G. Closed-chain quad strengthening with increasing resistance     VI. 5- 12 Months          A. Progressive Strengthening without restrictions on extension        B. Proprioceptive exercise        C. Sports specific activities        D. Biking and swimming        E. Return to all sports (per physicians instructions)        F. Protective brace for pivoting and jumping activities until 24 months        G. Running sports to cutting sports        H. Jumping sports last        I. Serial isokinetic testing as indicated        J. Running program with emphasis on agility and power        K.  Functional activities             1. side- cutting             2. back pedaling             3. cutting             4. plyometrics

## 2021-07-14 ENCOUNTER — HOSPITAL ENCOUNTER (OUTPATIENT)
Age: 31
Discharge: HOME | End: 2021-07-16
Payer: MEDICAID

## 2021-07-14 DIAGNOSIS — Z01.818 PRE-OP EXAM: Primary | ICD-10-CM

## 2021-07-14 DIAGNOSIS — Z01.818 PRE-OP EXAM: ICD-10-CM

## 2021-07-14 PROCEDURE — U0003 INFECTIOUS AGENT DETECTION BY NUCLEIC ACID (DNA OR RNA); SEVERE ACUTE RESPIRATORY SYNDROME CORONAVIRUS 2 (SARS-COV-2) (CORONAVIRUS DISEASE [COVID-19]), AMPLIFIED PROBE TECHNIQUE, MAKING USE OF HIGH THROUGHPUT TECHNOLOGIES AS DESCRIBED BY CMS-2020-01-R: HCPCS

## 2021-07-14 PROCEDURE — U0005 INFEC AGEN DETEC AMPLI PROBE: HCPCS

## 2021-07-16 LAB — SARS-COV-2, PCR: NOT DETECTED

## 2021-07-16 NOTE — PROGRESS NOTES
[address] [facility]                                                                                                                    PRE OP INSTRUCTIONS FOR  Antoinette Spring        Date: 7/16/2021    Date of surgery:  7/19/2021    Arrival Time: Hospital will call you between 5pm and 7pm with your final arrival time for surgery    1. Do not eat or drink anything after    Midnight   prior to surgery. This includes no water, chewing gum, mints or ice chips. 2. Take the following medications with a small sip of water on the morning of Surgery: take [address]    3. Diabetics may take evening dose of insulin but none after midnight. If you feel symptomatic or low blood sugar morning of surgery drink 1-2 ounces of apple juice only. 4. Aspirin, Ibuprofen, Advil, Naproxen, Vitamin E and other Anti-inflammatory products should be stopped  before surgery  as directed by your physician. Take Tylenol only unless instructed otherwise by your surgeon. 5. Check with your Doctor regarding stopping Plavix, Coumadin, Lovenox, Eliquis, Effient, or other blood thinners. 6. Do not smoke,use illicit drugs and do not drink any alcoholic beverages 24 hours prior to surgery. 7. You may brush your teeth the morning of surgery. DO NOT SWALLOW WATER    8. You MUST make arrangements for a responsible adult to take you home after your surgery. You will not be allowed to leave alone or drive yourself home. It is strongly suggested someone stay with you the first 24 hrs. Your surgery will be cancelled if you do not have a ride home. 9. PEDIATRIC PATIENTS ONLY:  A parent/legal guardian must accompany a child scheduled for surgery and plan to stay at the hospital until the child is discharged. Please do not bring other children with you.     10. Please wear simple, loose fitting clothing to the hospital.  Antwan Sanchez not bring valuables (money, credit cards, checkbooks, etc.) Do not wear any makeup (including no eye makeup) or nail polish on your fingers or toes. 11. DO NOT wear any jewelry or piercings on day of surgery. All body piercing jewelry must be removed. 12. Shower the night before surgery with __x_Antibacterial soap /REFUGIO WIPES________    13. TOTAL JOINT REPLACEMENT/HYSTERECTOMY PATIENTS ONLY---Remember to bring Blood Bank bracelet to the hospital on the day of surgery. 14. If you have a Living Will and Durable Power of  for Healthcare, please bring in a copy. 15. If appropriate bring crutches, inspirex, WALKER, CANE etc... 12. Notify your Surgeon if you develop any illness between now and surgery time, cough, cold, fever, sore throat, nausea, vomiting, etc.  Please notify your surgeon if you experience dizziness, shortness of breath or blurred vision between now & the time of your surgery. 17. If you have ___dentures, they will be removed before going to the OR; we will provide you a container. If you wear ___contact lenses or ___glasses, they will be removed; please bring a case for them. 18. To provide excellent care visitors will be limited to 2 in the room at any given time. 19. Please bring picture ID and insurance card. 20. Sleep apnea patients need to bring CPAP AND SETTINGS to hospital on day of surgery. 21. During flu season no children under the age of 15 are permitted in the hospital for the safety of all patients. 22. Other                  Please call AMBULATORY CARE if you have any further questions.    [address] [facility]     [address]

## 2021-07-19 ENCOUNTER — ANESTHESIA (OUTPATIENT)
Dept: OPERATING ROOM | Age: 31
End: 2021-07-19
Payer: MEDICAID

## 2021-07-19 ENCOUNTER — HOSPITAL ENCOUNTER (OUTPATIENT)
Age: 31
Setting detail: OUTPATIENT SURGERY
Discharge: HOME | End: 2021-07-19
Attending: ORTHOPAEDIC SURGERY | Admitting: ORTHOPAEDIC SURGERY
Payer: MEDICAID

## 2021-07-19 ENCOUNTER — ANESTHESIA EVENT (OUTPATIENT)
Dept: OPERATING ROOM | Age: 31
End: 2021-07-19
Payer: MEDICAID

## 2021-07-19 VITALS
TEMPERATURE: 94.5 F | OXYGEN SATURATION: 99 % | DIASTOLIC BLOOD PRESSURE: 84 MMHG | RESPIRATION RATE: 6 BRPM | SYSTOLIC BLOOD PRESSURE: 133 MMHG

## 2021-07-19 VITALS
BODY MASS INDEX: 27.28 KG/M2 | RESPIRATION RATE: 18 BRPM | HEART RATE: 89 BPM | SYSTOLIC BLOOD PRESSURE: 158 MMHG | HEIGHT: 68 IN | OXYGEN SATURATION: 98 % | WEIGHT: 180 LBS | DIASTOLIC BLOOD PRESSURE: 90 MMHG | TEMPERATURE: 97.9 F

## 2021-07-19 DIAGNOSIS — S83.512A RUPTURE OF ANTERIOR CRUCIATE LIGAMENT OF LEFT KNEE, INITIAL ENCOUNTER: Primary | ICD-10-CM

## 2021-07-19 PROCEDURE — 3600000003 HC SURGERY LEVEL 3 BASE: Performed by: ORTHOPAEDIC SURGERY

## 2021-07-19 PROCEDURE — 7100000001 HC PACU RECOVERY - ADDTL 15 MIN: Performed by: ORTHOPAEDIC SURGERY

## 2021-07-19 PROCEDURE — 7100000011 HC PHASE II RECOVERY - ADDTL 15 MIN: Performed by: ORTHOPAEDIC SURGERY

## 2021-07-19 PROCEDURE — 3600000013 HC SURGERY LEVEL 3 ADDTL 15MIN: Performed by: ORTHOPAEDIC SURGERY

## 2021-07-19 PROCEDURE — 6360000002 HC RX W HCPCS: Performed by: ANESTHESIOLOGY

## 2021-07-19 PROCEDURE — C1713 ANCHOR/SCREW BN/BN,TIS/BN: HCPCS | Performed by: ORTHOPAEDIC SURGERY

## 2021-07-19 PROCEDURE — 2709999900 HC NON-CHARGEABLE SUPPLY: Performed by: ORTHOPAEDIC SURGERY

## 2021-07-19 PROCEDURE — 2580000003 HC RX 258

## 2021-07-19 PROCEDURE — 7100000000 HC PACU RECOVERY - FIRST 15 MIN: Performed by: ORTHOPAEDIC SURGERY

## 2021-07-19 PROCEDURE — 6360000002 HC RX W HCPCS

## 2021-07-19 PROCEDURE — 7100000010 HC PHASE II RECOVERY - FIRST 15 MIN: Performed by: ORTHOPAEDIC SURGERY

## 2021-07-19 PROCEDURE — 3700000000 HC ANESTHESIA ATTENDED CARE: Performed by: ORTHOPAEDIC SURGERY

## 2021-07-19 PROCEDURE — C1776 JOINT DEVICE (IMPLANTABLE): HCPCS | Performed by: ORTHOPAEDIC SURGERY

## 2021-07-19 PROCEDURE — 64447 NJX AA&/STRD FEMORAL NRV IMG: CPT | Performed by: ANESTHESIOLOGY

## 2021-07-19 PROCEDURE — 2580000003 HC RX 258: Performed by: ANESTHESIOLOGY

## 2021-07-19 PROCEDURE — 29888 ARTHRS AID ACL RPR/AGMNTJ: CPT | Performed by: ORTHOPAEDIC SURGERY

## 2021-07-19 PROCEDURE — 6360000002 HC RX W HCPCS: Performed by: ORTHOPAEDIC SURGERY

## 2021-07-19 PROCEDURE — 3700000001 HC ADD 15 MINUTES (ANESTHESIA): Performed by: ORTHOPAEDIC SURGERY

## 2021-07-19 PROCEDURE — 2580000003 HC RX 258: Performed by: ORTHOPAEDIC SURGERY

## 2021-07-19 PROCEDURE — 2720000010 HC SURG SUPPLY STERILE: Performed by: ORTHOPAEDIC SURGERY

## 2021-07-19 DEVICE — SYSTEM FIX BNE TEND BNE W/ 10MM FLIPCUTTER II TIGHTROPE: Type: IMPLANTABLE DEVICE | Site: KNEE | Status: FUNCTIONAL

## 2021-07-19 DEVICE — ANCHOR SFT TISS OPN ADJ CORT 4 PNT KNOTLESS FIX SYS: Type: IMPLANTABLE DEVICE | Site: KNEE | Status: FUNCTIONAL

## 2021-07-19 DEVICE — CONSTRUCT PRE-SUTURED QUADLINK 9-11MM 60-75MM: Type: IMPLANTABLE DEVICE | Status: FUNCTIONAL

## 2021-07-19 DEVICE — BUTTON FIX W8XL12MM TI ATTCH SYS ALLGRFT CONSTRUCT FOR: Type: IMPLANTABLE DEVICE | Site: KNEE | Status: FUNCTIONAL

## 2021-07-19 RX ORDER — DEXAMETHASONE SODIUM PHOSPHATE 4 MG/ML
INJECTION, SOLUTION INTRA-ARTICULAR; INTRALESIONAL; INTRAMUSCULAR; INTRAVENOUS; SOFT TISSUE PRN
Status: DISCONTINUED | OUTPATIENT
Start: 2021-07-19 | End: 2021-07-19 | Stop reason: SDUPTHER

## 2021-07-19 RX ORDER — HYDROCODONE BITARTRATE AND ACETAMINOPHEN 5; 325 MG/1; MG/1
2 TABLET ORAL PRN
Status: DISCONTINUED | OUTPATIENT
Start: 2021-07-19 | End: 2021-07-19 | Stop reason: HOSPADM

## 2021-07-19 RX ORDER — OXYCODONE HYDROCHLORIDE AND ACETAMINOPHEN 5; 325 MG/1; MG/1
1 TABLET ORAL EVERY 6 HOURS PRN
Qty: 28 TABLET | Refills: 0 | Status: SHIPPED | OUTPATIENT
Start: 2021-07-19 | End: 2021-07-26

## 2021-07-19 RX ORDER — SODIUM CHLORIDE 9 MG/ML
INJECTION, SOLUTION INTRAVENOUS CONTINUOUS PRN
Status: DISCONTINUED | OUTPATIENT
Start: 2021-07-19 | End: 2021-07-19 | Stop reason: SDUPTHER

## 2021-07-19 RX ORDER — FENTANYL CITRATE 50 UG/ML
INJECTION, SOLUTION INTRAMUSCULAR; INTRAVENOUS PRN
Status: DISCONTINUED | OUTPATIENT
Start: 2021-07-19 | End: 2021-07-19 | Stop reason: SDUPTHER

## 2021-07-19 RX ORDER — HYDROCODONE BITARTRATE AND ACETAMINOPHEN 5; 325 MG/1; MG/1
1 TABLET ORAL PRN
Status: DISCONTINUED | OUTPATIENT
Start: 2021-07-19 | End: 2021-07-19 | Stop reason: HOSPADM

## 2021-07-19 RX ORDER — PROMETHAZINE HYDROCHLORIDE 25 MG/ML
6.25 INJECTION, SOLUTION INTRAMUSCULAR; INTRAVENOUS
Status: COMPLETED | OUTPATIENT
Start: 2021-07-19 | End: 2021-07-19

## 2021-07-19 RX ORDER — ONDANSETRON 2 MG/ML
INJECTION INTRAMUSCULAR; INTRAVENOUS PRN
Status: DISCONTINUED | OUTPATIENT
Start: 2021-07-19 | End: 2021-07-19 | Stop reason: SDUPTHER

## 2021-07-19 RX ORDER — MORPHINE SULFATE 2 MG/ML
2 INJECTION, SOLUTION INTRAMUSCULAR; INTRAVENOUS EVERY 5 MIN PRN
Status: DISCONTINUED | OUTPATIENT
Start: 2021-07-19 | End: 2021-07-19 | Stop reason: HOSPADM

## 2021-07-19 RX ORDER — ONDANSETRON 4 MG/1
4 TABLET, FILM COATED ORAL EVERY 8 HOURS PRN
Qty: 20 TABLET | Refills: 0 | Status: SHIPPED | OUTPATIENT
Start: 2021-07-19 | End: 2022-02-08

## 2021-07-19 RX ORDER — SODIUM CHLORIDE 0.9 % (FLUSH) 0.9 %
5-40 SYRINGE (ML) INJECTION PRN
Status: DISCONTINUED | OUTPATIENT
Start: 2021-07-19 | End: 2021-07-19 | Stop reason: HOSPADM

## 2021-07-19 RX ORDER — KETOROLAC TROMETHAMINE 10 MG/1
10 TABLET, FILM COATED ORAL EVERY 6 HOURS PRN
Qty: 20 TABLET | Refills: 0 | Status: SHIPPED | OUTPATIENT
Start: 2021-07-19 | End: 2022-02-08

## 2021-07-19 RX ORDER — PROMETHAZINE HYDROCHLORIDE 25 MG/ML
INJECTION, SOLUTION INTRAMUSCULAR; INTRAVENOUS
Status: COMPLETED
Start: 2021-07-19 | End: 2021-07-19

## 2021-07-19 RX ORDER — ROPIVACAINE HYDROCHLORIDE 5 MG/ML
INJECTION, SOLUTION EPIDURAL; INFILTRATION; PERINEURAL
Status: COMPLETED
Start: 2021-07-19 | End: 2021-07-19

## 2021-07-19 RX ORDER — SODIUM CHLORIDE, SODIUM LACTATE, POTASSIUM CHLORIDE, CALCIUM CHLORIDE 600; 310; 30; 20 MG/100ML; MG/100ML; MG/100ML; MG/100ML
INJECTION, SOLUTION INTRAVENOUS CONTINUOUS
Status: DISCONTINUED | OUTPATIENT
Start: 2021-07-19 | End: 2021-07-19 | Stop reason: HOSPADM

## 2021-07-19 RX ORDER — LIDOCAINE HYDROCHLORIDE 20 MG/ML
INJECTION, SOLUTION INTRAVENOUS PRN
Status: DISCONTINUED | OUTPATIENT
Start: 2021-07-19 | End: 2021-07-19 | Stop reason: SDUPTHER

## 2021-07-19 RX ORDER — MEPERIDINE HYDROCHLORIDE 25 MG/ML
INJECTION INTRAMUSCULAR; INTRAVENOUS; SUBCUTANEOUS
Status: COMPLETED
Start: 2021-07-19 | End: 2021-07-19

## 2021-07-19 RX ORDER — FENTANYL CITRATE 50 UG/ML
25 INJECTION, SOLUTION INTRAMUSCULAR; INTRAVENOUS EVERY 5 MIN PRN
Status: DISCONTINUED | OUTPATIENT
Start: 2021-07-19 | End: 2021-07-19 | Stop reason: HOSPADM

## 2021-07-19 RX ORDER — MEPERIDINE HYDROCHLORIDE 25 MG/ML
12.5 INJECTION INTRAMUSCULAR; INTRAVENOUS; SUBCUTANEOUS EVERY 5 MIN PRN
Status: DISCONTINUED | OUTPATIENT
Start: 2021-07-19 | End: 2021-07-19 | Stop reason: HOSPADM

## 2021-07-19 RX ORDER — DOCUSATE SODIUM 100 MG/1
100 CAPSULE, LIQUID FILLED ORAL 2 TIMES DAILY PRN
Qty: 20 CAPSULE | Refills: 1 | Status: SHIPPED | OUTPATIENT
Start: 2021-07-19 | End: 2022-02-08

## 2021-07-19 RX ORDER — ROPIVACAINE HYDROCHLORIDE 5 MG/ML
INJECTION, SOLUTION EPIDURAL; INFILTRATION; PERINEURAL
Status: COMPLETED | OUTPATIENT
Start: 2021-07-19 | End: 2021-07-19

## 2021-07-19 RX ORDER — KETOROLAC TROMETHAMINE 30 MG/ML
INJECTION, SOLUTION INTRAMUSCULAR; INTRAVENOUS
Status: COMPLETED
Start: 2021-07-19 | End: 2021-07-19

## 2021-07-19 RX ORDER — PROPOFOL 10 MG/ML
INJECTION, EMULSION INTRAVENOUS PRN
Status: DISCONTINUED | OUTPATIENT
Start: 2021-07-19 | End: 2021-07-19 | Stop reason: SDUPTHER

## 2021-07-19 RX ORDER — MIDAZOLAM HYDROCHLORIDE 1 MG/ML
INJECTION INTRAMUSCULAR; INTRAVENOUS PRN
Status: DISCONTINUED | OUTPATIENT
Start: 2021-07-19 | End: 2021-07-19 | Stop reason: SDUPTHER

## 2021-07-19 RX ORDER — ASPIRIN 325 MG
325 TABLET, DELAYED RELEASE (ENTERIC COATED) ORAL DAILY
Qty: 14 TABLET | Refills: 0 | Status: SHIPPED | OUTPATIENT
Start: 2021-07-19 | End: 2022-02-08

## 2021-07-19 RX ADMIN — PROPOFOL 170 MG: 10 INJECTION, EMULSION INTRAVENOUS at 13:11

## 2021-07-19 RX ADMIN — KETOROLAC TROMETHAMINE 30 MG: 30 INJECTION, SOLUTION INTRAMUSCULAR at 15:45

## 2021-07-19 RX ADMIN — ONDANSETRON 4 MG: 2 INJECTION INTRAMUSCULAR; INTRAVENOUS at 15:01

## 2021-07-19 RX ADMIN — FENTANYL CITRATE 50 MCG: 50 INJECTION, SOLUTION INTRAMUSCULAR; INTRAVENOUS at 14:17

## 2021-07-19 RX ADMIN — LIDOCAINE HYDROCHLORIDE 60 MG: 20 INJECTION, SOLUTION INTRAVENOUS at 13:19

## 2021-07-19 RX ADMIN — HYDROMORPHONE HYDROCHLORIDE 0.5 MG: 1 INJECTION, SOLUTION INTRAMUSCULAR; INTRAVENOUS; SUBCUTANEOUS at 15:51

## 2021-07-19 RX ADMIN — DEXAMETHASONE SODIUM PHOSPHATE 10 MG: 4 INJECTION, SOLUTION INTRAMUSCULAR; INTRAVENOUS at 13:26

## 2021-07-19 RX ADMIN — HYDROMORPHONE HYDROCHLORIDE 0.5 MG: 1 INJECTION, SOLUTION INTRAMUSCULAR; INTRAVENOUS; SUBCUTANEOUS at 15:28

## 2021-07-19 RX ADMIN — FENTANYL CITRATE 100 MCG: 50 INJECTION, SOLUTION INTRAMUSCULAR; INTRAVENOUS at 13:11

## 2021-07-19 RX ADMIN — MEPERIDINE HYDROCHLORIDE 12.5 MG: 25 INJECTION INTRAMUSCULAR; INTRAVENOUS; SUBCUTANEOUS at 15:35

## 2021-07-19 RX ADMIN — MEPERIDINE HYDROCHLORIDE 12.5 MG: 25 INJECTION, SOLUTION INTRAMUSCULAR; INTRAVENOUS; SUBCUTANEOUS at 15:35

## 2021-07-19 RX ADMIN — MEPERIDINE HYDROCHLORIDE 12.5 MG: 25 INJECTION, SOLUTION INTRAMUSCULAR; INTRAVENOUS; SUBCUTANEOUS at 15:40

## 2021-07-19 RX ADMIN — SODIUM CHLORIDE: 9 INJECTION, SOLUTION INTRAVENOUS at 13:02

## 2021-07-19 RX ADMIN — FENTANYL CITRATE 50 MCG: 50 INJECTION, SOLUTION INTRAMUSCULAR; INTRAVENOUS at 14:12

## 2021-07-19 RX ADMIN — ROPIVACAINE HYDROCHLORIDE 30 ML: 5 INJECTION, SOLUTION EPIDURAL; INFILTRATION; PERINEURAL at 15:03

## 2021-07-19 RX ADMIN — Medication 2000 MG: at 13:04

## 2021-07-19 RX ADMIN — PROMETHAZINE HYDROCHLORIDE 6.25 MG: 25 INJECTION INTRAMUSCULAR; INTRAVENOUS at 16:00

## 2021-07-19 RX ADMIN — MEPERIDINE HYDROCHLORIDE 12.5 MG: 25 INJECTION INTRAMUSCULAR; INTRAVENOUS; SUBCUTANEOUS at 15:40

## 2021-07-19 RX ADMIN — SODIUM CHLORIDE, POTASSIUM CHLORIDE, SODIUM LACTATE AND CALCIUM CHLORIDE: 600; 310; 30; 20 INJECTION, SOLUTION INTRAVENOUS at 10:13

## 2021-07-19 RX ADMIN — SODIUM CHLORIDE: 9 INJECTION, SOLUTION INTRAVENOUS at 14:40

## 2021-07-19 RX ADMIN — MIDAZOLAM 2 MG: 1 INJECTION INTRAMUSCULAR; INTRAVENOUS at 13:02

## 2021-07-19 RX ADMIN — PROMETHAZINE HYDROCHLORIDE 6.25 MG: 25 INJECTION, SOLUTION INTRAMUSCULAR; INTRAVENOUS at 16:00

## 2021-07-19 RX ADMIN — FENTANYL CITRATE 50 MCG: 50 INJECTION, SOLUTION INTRAMUSCULAR; INTRAVENOUS at 15:19

## 2021-07-19 ASSESSMENT — PULMONARY FUNCTION TESTS
PIF_VALUE: 18
PIF_VALUE: 2
PIF_VALUE: 21
PIF_VALUE: 7
PIF_VALUE: 18
PIF_VALUE: 22
PIF_VALUE: 2
PIF_VALUE: 2
PIF_VALUE: 23
PIF_VALUE: 10
PIF_VALUE: 2
PIF_VALUE: 3
PIF_VALUE: 17
PIF_VALUE: 1
PIF_VALUE: 18
PIF_VALUE: 11
PIF_VALUE: 21
PIF_VALUE: 16
PIF_VALUE: 2
PIF_VALUE: 21
PIF_VALUE: 2
PIF_VALUE: 21
PIF_VALUE: 3
PIF_VALUE: 17
PIF_VALUE: 2
PIF_VALUE: 21
PIF_VALUE: 22
PIF_VALUE: 2
PIF_VALUE: 3
PIF_VALUE: 16
PIF_VALUE: 16
PIF_VALUE: 2
PIF_VALUE: 2
PIF_VALUE: 21
PIF_VALUE: 3
PIF_VALUE: 21
PIF_VALUE: 2
PIF_VALUE: 16
PIF_VALUE: 17
PIF_VALUE: 2
PIF_VALUE: 4
PIF_VALUE: 3
PIF_VALUE: 2
PIF_VALUE: 2
PIF_VALUE: 22
PIF_VALUE: 1
PIF_VALUE: 23
PIF_VALUE: 2
PIF_VALUE: 20
PIF_VALUE: 2
PIF_VALUE: 18
PIF_VALUE: 2
PIF_VALUE: 21
PIF_VALUE: 2
PIF_VALUE: 17
PIF_VALUE: 20
PIF_VALUE: 2
PIF_VALUE: 21
PIF_VALUE: 15
PIF_VALUE: 22
PIF_VALUE: 17
PIF_VALUE: 17
PIF_VALUE: 2
PIF_VALUE: 2
PIF_VALUE: 21
PIF_VALUE: 2
PIF_VALUE: 15
PIF_VALUE: 2
PIF_VALUE: 21
PIF_VALUE: 3
PIF_VALUE: 22
PIF_VALUE: 1
PIF_VALUE: 18
PIF_VALUE: 17
PIF_VALUE: 27
PIF_VALUE: 21
PIF_VALUE: 2
PIF_VALUE: 18
PIF_VALUE: 2
PIF_VALUE: 1
PIF_VALUE: 3
PIF_VALUE: 2
PIF_VALUE: 21
PIF_VALUE: 2
PIF_VALUE: 17
PIF_VALUE: 3
PIF_VALUE: 2
PIF_VALUE: 17
PIF_VALUE: 22
PIF_VALUE: 21
PIF_VALUE: 2
PIF_VALUE: 18
PIF_VALUE: 21
PIF_VALUE: 21
PIF_VALUE: 22
PIF_VALUE: 2
PIF_VALUE: 2
PIF_VALUE: 17
PIF_VALUE: 16
PIF_VALUE: 23
PIF_VALUE: 2
PIF_VALUE: 21
PIF_VALUE: 17
PIF_VALUE: 2
PIF_VALUE: 21
PIF_VALUE: 2
PIF_VALUE: 3
PIF_VALUE: 22
PIF_VALUE: 1
PIF_VALUE: 21
PIF_VALUE: 2
PIF_VALUE: 21
PIF_VALUE: 22
PIF_VALUE: 21
PIF_VALUE: 3
PIF_VALUE: 2

## 2021-07-19 ASSESSMENT — PAIN DESCRIPTION - LOCATION
LOCATION: KNEE

## 2021-07-19 ASSESSMENT — PAIN SCALES - GENERAL
PAINLEVEL_OUTOF10: 7
PAINLEVEL_OUTOF10: 10
PAINLEVEL_OUTOF10: 8
PAINLEVEL_OUTOF10: 10
PAINLEVEL_OUTOF10: 10
PAINLEVEL_OUTOF10: 8
PAINLEVEL_OUTOF10: 9
PAINLEVEL_OUTOF10: 10

## 2021-07-19 ASSESSMENT — PAIN DESCRIPTION - DESCRIPTORS
DESCRIPTORS: SORE
DESCRIPTORS: ACHING;DULL;DISCOMFORT

## 2021-07-19 ASSESSMENT — PAIN DESCRIPTION - ORIENTATION
ORIENTATION: LEFT

## 2021-07-19 ASSESSMENT — PAIN DESCRIPTION - FREQUENCY
FREQUENCY: CONTINUOUS

## 2021-07-19 ASSESSMENT — PAIN - FUNCTIONAL ASSESSMENT: PAIN_FUNCTIONAL_ASSESSMENT: 0-10

## 2021-07-19 ASSESSMENT — PAIN DESCRIPTION - PAIN TYPE
TYPE: SURGICAL PAIN

## 2021-07-19 ASSESSMENT — PAIN DESCRIPTION - ONSET: ONSET: ON-GOING

## 2021-07-19 ASSESSMENT — PAIN DESCRIPTION - PROGRESSION: CLINICAL_PROGRESSION: NOT CHANGED

## 2021-07-19 ASSESSMENT — LIFESTYLE VARIABLES: SMOKING_STATUS: 1

## 2021-07-19 NOTE — OP NOTE
Operative Note      Patient: Estiven Monet  YOB: 1990  MRN: [id number]    Date of Procedure: 7/19/2021    Pre-Op Diagnosis: COMPLETE TEAR OF ANTERIOR CRUCIATE LIGAMENT LEFT KNEE     Post-Op Diagnosis:   1. Left knee ACL tear  2. Left knee OA medial femoral condyle          Procedure:  1. Left knee ACL arthroscopic reconstruction  2. Left knee arthroscopic chondroplasty medial tibial condyle     Surgeon(s): Tomasz Graff DO    Assistant:   Resident: Viktoria Robles DO; Rosario Quiroz DO    Anesthesia: General    Estimated Blood Loss (mL): Minimal    Complications: None    Specimens:   * No specimens in log *    Implants:  Implant Name Type Inv. Item Serial No.  Lot No. LRB No. Used Action   CONSTRUCT PRE-SUTURED QUADLINK 9-11MM 60-75MM  CONSTRUCT PRE-SUTURED QUADLINK 9-11MM 60-75MM  [employer] [id number] Left 1 Implanted   ANCHOR SFT TISS OPN ADJ UMBERTO 4 PNT KNOTLESS FIX SYS  ANCHOR SFT TISS OPN ADJ UMBERTO 4 PNT KNOTLESS FIX SYS  [employer] [id number] Left 1 Implanted   SYSTEM FIX BNE TEND BNE W/ 10MM FLIPCUTTER II TIGHTROPE  SYSTEM FIX BNE TEND BNE W/ 10MM FLIPCUTTER II TIGHTROPE  [employer] [id number] Left 2 Implanted   BUTTON FIX [id number] TI ATTCH SYS ALLGRFT CONSTRUCT FOR  BUTTON FIX [id number] TI ATTCH SYS ALLGRFT CONSTRUCT FOR  [employer] [id number] Left 1 Implanted         Drains: * No LDAs found *    Indications:  Patient is a 27 y.o. male who sustained an injury to her leftt knee ACL. MRI demonstrated Left knee ACL rupture with the above findings. Physical exam also correlated to ACL rupture. Patient elects to undergo the above-stated procedure after understanding risks and benefits including but not limited to infection, neurovascular injury, postoperative stiffness, possible need for revision, possible rerupture, and persistent instability. Description of Procedure:  Patient was identified in the preoperative area. Correct extremity marked and consent signed. millimeter [employer] flipcutter to a depth of 30 mm. Graft was pulled through the knee through a medial portal. The button was flipped on the femoral cortex with an optimized graft tunnel interference by toggling the sutures. Graft was then pulled into the knee through the medial portal at the end of the tibial socket. The graft was tensioned on the femoral side in flexion, tibial side in extension. There is no graft impingement. There was full range of motion. Negation in Lachman test. Joint was thoroughly irrigated and suctioned. Incisions were closed with 3-0 nylon. Dry sterile dressings were applied consisting of Xeroform, 4 x 4 gauze, ABDs, cast padding, Ace bandage, postoperative knee brace in extension and ice. The assistant surgeon was instrumental in assistance with opening, closure, suctioning, and protection and positioning of the leg throughout the procedure.         Disposition: The patient was awoken from anesthesia in the operating room, having tolerated the procedure well, was transported to the recovery room in stable condition      Electronically signed by Elmer Smith DO on 7/19/2021 at 6:59 PM

## 2021-07-19 NOTE — ANESTHESIA PRE PROCEDURE
Department of Anesthesiology  Preprocedure Note       Name:  Gm Currie   Age:  27 y.o.  :  1990                                          MRN:  [id number]         Date:  2021      Surgeon: Inder Oakes): Beny Menchaca DO    Procedure: Procedure(s):  LEFT KNEE ARTHROSCOPY, ANTERIOR CRUCIATE LIGAMENT RECONSTRUCTION (ALLGRAFT), DEBRIDEMENT ([employer])    Medications prior to admission:   Prior to Admission medications    Medication Sig Start Date End Date Taking? Authorizing Provider   aspirin 325 MG EC tablet Take 1 tablet by mouth daily for 14 days 21 Yes Beny Handing, DO   docusate sodium (COLACE) 100 MG capsule Take 1 capsule by mouth 2 times daily as needed for Constipation 21  Yes Beny Handing, DO   ketorolac (TORADOL) 10 MG tablet Take 1 tablet by mouth every 6 hours as needed for Pain Patient received prior injection 21 Yes Beny Schmidting, DO   oxyCODONE-acetaminophen (PERCOCET) 5-325 MG per tablet Take 1 tablet by mouth every 6 hours as needed for Pain for up to 7 days. 21 Yes Beny Schmidting, DO   ondansetron (ZOFRAN) 4 MG tablet Take 1 tablet by mouth every 8 hours as needed for Nausea or Vomiting 21  Yes Beny Schmidting, DO       Current medications:    Current Facility-Administered Medications   Medication Dose Route Frequency Provider Last Rate Last Admin    ceFAZolin (ANCEF) 2000 mg in sterile water 20 mL IV syringe  2,000 mg Intravenous Once Beny Menchaca, DO        lactated ringers infusion   Intravenous Continuous Katlyn Boykin  mL/hr at 21 1013 New Bag at 21 1013    sodium chloride flush 0.9 % injection 5-40 mL  5-40 mL Intravenous PRN Katlyn Boykin MD           Allergies:  No Known Allergies    Problem List:    Patient Active Problem List   Diagnosis Code    Closed fracture of medial plateau of left tibia S82.132A    Left anterior cruciate ligament tear N64.351T       Past Medical History:  History reviewed.  No pertinent past medical history. Past Surgical History:        Procedure Laterality Date    FRACTURE SURGERY      acl repair left       Social History:    Social History     Tobacco Use    Smoking status: Current Every Day Smoker     Packs/day: 1.00     Types: Cigarettes    Smokeless tobacco: Never Used   Substance Use Topics    Alcohol use: Yes     Comment: rarely                                Ready to quit: Not Answered  Counseling given: Not Answered      Vital Signs (Current):   Vitals:    07/16/21 0937 07/19/21 1005   BP:  125/62   Pulse:  75   Resp:  16   Temp:  98.7 °F (37.1 °C)   TempSrc:  Infrared   SpO2:  96%   Weight: 180 lb (81.6 kg) 180 lb (81.6 kg)   Height: 5' 8\" (1.727 m) 5' 8\" (1.727 m)                                              BP Readings from Last 3 Encounters:   07/19/21 125/62   05/30/21 113/73   02/03/21 133/74       NPO Status: Time of last liquid consumption: 2300                        Time of last solid consumption: 2000                        Date of last liquid consumption: 07/18/21                        Date of last solid food consumption: 07/18/21    BMI:   Wt Readings from Last 3 Encounters:   07/19/21 180 lb (81.6 kg)   07/07/21 180 lb (81.6 kg)   06/30/21 180 lb (81.6 kg)     Body mass index is 27.37 kg/m².     CBC:   Lab Results   Component Value Date    WBC 14.2 03/11/2018    RBC 4.57 03/11/2018    HGB 14.5 03/11/2018    HCT 42.5 03/11/2018    MCV 93.0 03/11/2018    RDW 12.2 03/11/2018     03/11/2018       CMP:   Lab Results   Component Value Date     03/11/2018    K 4.1 03/11/2018     03/11/2018    CO2 32 03/11/2018    BUN 8 03/11/2018    CREATININE 0.7 03/11/2018    GFRAA >60 03/11/2018    LABGLOM >60 03/11/2018    GLUCOSE 101 03/11/2018    GLUCOSE 97 10/10/2010    PROT 7.0 03/11/2018    CALCIUM 9.9 03/11/2018    BILITOT 0.2 03/11/2018    ALKPHOS 53 03/11/2018    AST 16 03/11/2018    ALT 27 03/11/2018       POC Tests: No results for input(s): ERIC KELSEY, JAX, POCCL, POCBUN, POCHEMO, POCHCT in the last 72 hours. Coags:   Lab Results   Component Value Date    PROTIME 11.7 09/29/2017    INR 1.0 09/29/2017    APTT 29.1 09/29/2017       HCG (If Applicable): No results found for: PREGTESTUR, PREGSERUM, HCG, HCGQUANT     ABGs: No results found for: PHART, PO2ART, HCS8SHL, EUN6BUU, BEART, B9UDEZGD     Type & Screen (If Applicable):  No results found for: LABABO, LABRH    Drug/Infectious Status (If Applicable):  No results found for: HIV, HEPCAB    COVID-19 Screening (If Applicable):   Lab Results   Component Value Date    COVID19 Not Detected 07/14/2021           Anesthesia Evaluation  Patient summary reviewed no history of anesthetic complications:   Airway: Mallampati: II  TM distance: >3 FB   Neck ROM: full  Mouth opening: > = 3 FB Dental: normal exam         Pulmonary: breath sounds clear to auscultation  (+) current smoker          Patient smoked on day of surgery. Cardiovascular:Negative CV ROS            Rhythm: regular  Rate: normal                    Neuro/Psych:   Negative Neuro/Psych ROS              GI/Hepatic/Renal: Neg GI/Hepatic/Renal ROS       (-) no morbid obesity       Endo/Other: Negative Endo/Other ROS                     ROS comment: Left anterior cruciate ligament tear Abdominal:         (-) obese       Vascular: negative vascular ROS. Other Findings:             Anesthesia Plan      general     ASA 2       Induction: intravenous. MIPS: Postoperative opioids intended and Prophylactic antiemetics administered. Anesthetic plan and risks discussed with patient. Plan discussed with CRNA. DOS STAFF ADDENDUM:    Pt seen and examined, chart reviewed (including anesthesia, drug and allergy history). Anesthetic plan, risks, benefits, alternatives, and personnel involved discussed with patient. Patient verbalized an understanding and agrees to proceed.   Plan discussed with care team members and agreed upon.    Carlos Alberto Schroeder MD  Staff Anesthesiologist  12:33 PM      Carlos Alberto Schroeder MD   7/19/2021

## 2021-07-19 NOTE — H&P
No chief complaint on file. HPI:    Patient is 27 y.o. male complaining chronic, atraumatic, insidious onset left knee pain for several weeks after feeling a pop getting out of the car. He admits to stiffness, deep, aching pain, swelling, difficulty with stairs and ambulating far distances. He admits to gross instability specifically in his Left knee. Previous treatments include rest, ice, and anti-inflammatory medication and HEP without much relief. Follows up after MRI and range of motion check. ROS:    Skin: (-) rash,(-) psoriasis,(-) eczema, (-)skin cancer. Neurologic: (-)numbness, (-)tingling, (-)headaches, (-) LOC. Cardiovascular: (-) Chest pain, (-) swelling in legs/feet, (-) SOB, (-) cramping in legs/feet with walking. All other review of systems negative except stated above or in HPI      History reviewed. No pertinent past medical history. Past Surgical History:   Procedure Laterality Date    FRACTURE SURGERY      acl repair left       Current Outpatient Medications:     aspirin 325 MG EC tablet, Take 1 tablet by mouth daily for 14 days, Disp: 14 tablet, Rfl: 0    docusate sodium (COLACE) 100 MG capsule, Take 1 capsule by mouth 2 times daily as needed for Constipation, Disp: 20 capsule, Rfl: 1    ketorolac (TORADOL) 10 MG tablet, Take 1 tablet by mouth every 6 hours as needed for Pain Patient received prior injection, Disp: 20 tablet, Rfl: 0    oxyCODONE-acetaminophen (PERCOCET) 5-325 MG per tablet, Take 1 tablet by mouth every 6 hours as needed for Pain for up to 7 days. , Disp: 28 tablet, Rfl: 0    ondansetron (ZOFRAN) 4 MG tablet, Take 1 tablet by mouth every 8 hours as needed for Nausea or Vomiting, Disp: 20 tablet, Rfl: 0  No Known Allergies  Social History     Socioeconomic History    Marital status:      Spouse name: Not on file    Number of children: Not on file    Years of education: Not on file    Highest education level: Not on file   Occupational History    Not on file   Tobacco Use    Smoking status: Current Every Day Smoker     Packs/day: 1.00     Types: Cigarettes    Smokeless tobacco: Never Used   Vaping Use    Vaping Use: Former   Substance and Sexual Activity    Alcohol use: Yes     Comment: rarely    Drug use: No     Comment: 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  Sexual activity: Yes     Partners: Female   Other Topics Concern    Not on file   Social History Narrative    Not on file     Social Determinants of Health     Financial Resource Strain:     Difficulty of Paying Living Expenses:    Food Insecurity:     Worried About Running Out of Food in the Last Year:     [address] in the Last Year:    Transportation Needs:     Lack of Transportation (Medical):  Lack of Transportation (Non-Medical):    Physical Activity:     Days of Exercise per Week:     Minutes of Exercise per Session:    Stress:     Feeling of Stress :    Social Connections:     Frequency of Communication with Friends and Family:     Frequency of Social Gatherings with Friends and Family:     Attends Jew Services:     Active Member of Clubs or Organizations:     Attends Club or Organization Meetings:     Marital Status:    Intimate Partner Violence:     Fear of Current or Ex-Partner:     Emotionally Abused:     Physically Abused:     Sexually Abused:      History reviewed. No pertinent family history. Physical Exam:    /62   Pulse 75   Temp 98.7 °F (37.1 °C) (Infrared)   Resp 16   Ht 5' 8\" (1.727 m)   Wt 180 lb (81.6 kg)   SpO2 96%   BMI 27.37 kg/m²     GENERAL: alert, appears stated age, cooperative, no acute distress    HEENT: Head is normocephalic, atraumatic. PERRLA. SKIN: Clean, dry, intact. There is not any cellulitis or cutaneous lesions noted in the lower extremities except noted below in MSK    PULMONARY: breathing is regular and unlabored, no acute distress    CV:  The bilateral upper and lower extremities are warm and well-perfused with brisk capillary refill. 2+ pulses UE and LE bilateral.     PSYCHIATRY: Pleasant mood, appropriate behavior, follows commands    NEURO: Sensation is intact distally with light touch with no alteration. Motor exam of the lower extremities show quadriceps, hamstrings, foot dorsiflexion and plantarflexion grossly intact 5/5. LYMPH: No lymphedema present distally in upper or lower extremity. MUSCULOSKELETAL:    Left knee Exam:    mild effusion noted. No erythema/induration/fluctuance. Posterior medial joint line TTP. Stable to varus and valgus at 0 and 30 degrees of flexion. Negative Lachman's and posterior drawer. Negative patellar grind test and J sign. Compartments soft and compressible throughout leg. Active range of motion 0-120 with pain. Positive Carlie's positive Apley's, gait is antalgic        Imaging:  XR KNEE LEFT (3 VIEWS)    Result Date: 5/30/2021  EXAMINATION: THREE XRAY VIEWS OF THE LEFT KNEE 5/30/2021 2:47 pm COMPARISON: None. HISTORY: ORDERING SYSTEM PROVIDED HISTORY: pain TECHNOLOGIST PROVIDED HISTORY: Reason for exam:->pain FINDINGS: Alignment appears normal.  Sclerotic density is seen in the proximal tibia, likely bone island. There is a subtle lucency seen through the medial patellar facet on the sunrise view. Small joint effusion is seen     Subtle lucency is seen through the medial patellar facet. Correlate with point tenderness as this could represent nondisplaced patellar fracture Small joint effusion. MRI KNEE LEFT WO CONTRAST    Result Date: 6/4/2021  EXAMINATION: MRI OF THE LEFT KNEE WITHOUT CONTRAST, 6/4/2021 1:47 pm TECHNIQUE: Multiplanar multisequence MRI of the left knee was performed without the administration of intravenous contrast. COMPARISON: Left knee x-rays 05/30/2021 HISTORY: ORDERING SYSTEM PROVIDED HISTORY: Contusion of left knee, initial encounter FINDINGS: MENISCI: Medial and lateral menisci appear intact and normal in morphology.  No parameniscal cysts are seen. CRUCIATE LIGAMENTS: ACL is ruptured. PCL appears intact. EXTENSOR MECHANISM: Quadriceps and patellar tendons appear intact and unremarkable. Medial and lateral patellar retinacula appear intact. LATERAL COLLATERAL LIGAMENT COMPLEX: Lateral collateral ligament complex appears intact. There is edema noted about the components of the lateral collateral ligament complex without abnormality noted to the complex itself. Popliteus tendon appears intact. Mild edema in the popliteus muscle. MEDIAL COLLATERAL LIGAMENT COMPLEX: Medial collateral ligament appears intact. There is mild subcutaneous edema about the MCL. KNEE JOINT: Large knee joint effusion. Trace fat fluid level. No intra-articular loose bodies noted. No significant Baker's cyst. No degenerative changes are noted. No focal full-thickness chondral defects or osteochondral lesions. BONE MARROW: Nondepressed and nondisplaced subchondral fracture to the posterior aspect of the medial and tibial plateau with associated mild bone marrow edema. Mild bone marrow edema to the posterior aspect of the lateral tibial plateau without fracture line. No suspicious focal bony lesions. Rupture of the ACL. Nondisplaced and nondepressed small subchondral fracture to the posterior aspect of the medial tibial plateau with associated bone marrow edema. Likely bone marrow contusion to the posterior aspect of the lateral tibial plateau without fracture line. Large joint effusion with trace fat fluid level. Low-grade sprains of the lateral collateral ligament complex and medial collateral ligament. Low-grade strain/contusion of the popliteus muscle. The findings were sent to the Radiology Results [address] at 2:20 pm on 6/4/2021to be communicated to caregiver's office. Yeni Lema was seen today for knee pain.     Diagnoses and all orders for this visit:    Rupture of anterior cruciate ligament of left knee, initial encounter    Exercise counseling    Tobacco smoke exposure        Patient seen and examined. X-rays reviewed. Patient has little to no arthritis noted on x-rays today. However patient's main complaint is instability of symptomatic knee. Exam and history is consistent with possible meniscus tear. MRI recommended for further evaluation management. Patient seen and examined. MRI reviewed with patient in detail. Natural history and course discussed with patient in long discussion  Treatment options discussed with patient in detail including risks and benefits. The risks and benefits of the surgery were discussed with the patient. The risks are including but not limited to: infection, injuries to blood vessels and nerves, non relief of symptoms, arthrofibrosis of knee, need for further operative intervention, blood loss, PE/DVT, MI and death. The risks are understood by the patient and they wish to proceed with a Left knee arthroscopy, anterior cruciate ligament reconstruction,  possible menisectomy with debridement. The patient was counseled at length about the risks of jason Covid-19 during their perioperative period and any recovery window from their procedure. The patient was made aware that jason Covid-19  may worsen their prognosis for recovering from their procedure  and lend to a higher morbidity and/or mortality risk. All material risks, benefits, and reasonable alternatives including postponing the procedure were discussed. The patient does wish to proceed with the procedure at this time. Patient educated about the healing rates with this condition. Patient does smoke and does have secondhand smoke exposure. In this discussion of approximately 5 minutes, patient was counseled about decrease in smoking exposure regards to healing and overall good health. Patient seems reluctant but is willing to listen to the discussion in detail.  He states that he will try to cut down as much as possible and states that he will try to quit completely by the next visit.             Akash Guevara, DO

## 2021-07-19 NOTE — BRIEF OP NOTE
Brief Postoperative Note      Patient: Alta Orona  YOB: 1990  MRN: [id number]    Date of Procedure: 7/19/2021    Pre-Op Diagnosis: RUPTURE OF ANTERIOR CRUCIATE LIGAMENT OF THE LEFT KNEE    Post-Op Diagnosis: Same and chondral injury       Procedure(s):  LEFT KNEE ARTHROSCOPY, ANTERIOR CRUCIATE LIGAMENT RECONSTRUCTION (ALLGRAFT), DEBRIDEMENT ([address])    Surgeon(s): Kelsy Bruce DO    Assistant:  Resident: Edenilson Frazier DO; Abilio Hensley DO    Anesthesia: General    Estimated Blood Loss (mL): Minimal    Complications: None    Specimens:   * No specimens in log *    Implants:  Implant Name Type Inv.  Item Serial No.  Lot No. LRB No. Used Action   CONSTRUCT PRE-SUTURED QUADLINK 9-11MM 60-75MM  CONSTRUCT PRE-SUTURED QUADLINK 9-11MM 60-75MM  [facility]- [id number] Left 1 Implanted   ANCHOR SFT TISS OPN ADJ UMBERTO 4 PNT KNOTLESS FIX SYS  ANCHOR SFT TISS OPN ADJ UMBERTO 4 PNT KNOTLESS FIX SYS  [employer] [id number] Left 1 Implanted   SYSTEM FIX BNE TEND BNE W/ 10MM FLIPCUTTER II TIGHTROPE  SYSTEM FIX BNE TEND BNE W/ 10MM FLIPCUTTER II TIGHTROPE  [employer] [id number] Left 2 Implanted   BUTTON FIX [id number] TI ATTCH SYS ALLGRFT CONSTRUCT FOR  BUTTON FIX [id number] TI ATTCH SYS ALLGRFT CONSTRUCT FOR  [employer] [id number] Left 1 Implanted         Drains: * No LDAs found *    Findings: see report      Electronically signed by Kelsy Bruce DO on 7/19/2021 at 3:27 PM

## 2021-07-19 NOTE — H&P
I have reviewed the history and physical and examined the patient and find no relevant changes. I have reviewed with the patient and/or family the risks, benefits, and alternatives to the procedure. The patient was counseled at length about the risks of jason Covid-19 during their perioperative period and any recovery window from their procedure. The patient was made aware that jason Covid-19  may worsen their prognosis for recovering from their procedure  and lend to a higher morbidity and/or mortality risk. All material risks, benefits, and reasonable alternatives including postponing the procedure were discussed. The patient does wish to proceed with the procedure at this time.         Liset Linder,   7/19/2021

## 2021-07-19 NOTE — PROGRESS NOTES
CLINICAL PHARMACY NOTE: MEDS TO BEDS    Total # of Prescriptions Filled: 1   The following medications were delivered to the patient:  · PERCOCET 5/325MG     Additional Documentation:  LEFT WITH NURSE IN [facility]

## 2021-07-30 ENCOUNTER — OFFICE VISIT (OUTPATIENT)
Dept: ORTHOPEDIC SURGERY | Age: 31
End: 2021-07-30

## 2021-07-30 VITALS — BODY MASS INDEX: 27.28 KG/M2 | TEMPERATURE: 98 F | WEIGHT: 180 LBS | HEIGHT: 68 IN

## 2021-07-30 DIAGNOSIS — S83.512A RUPTURE OF ANTERIOR CRUCIATE LIGAMENT OF LEFT KNEE, INITIAL ENCOUNTER: Primary | ICD-10-CM

## 2021-07-30 PROCEDURE — 99024 POSTOP FOLLOW-UP VISIT: CPT | Performed by: ORTHOPAEDIC SURGERY

## 2021-07-30 RX ORDER — BUPRENORPHINE AND NALOXONE 8; 2 MG/1; MG/1
FILM, SOLUBLE BUCCAL; SUBLINGUAL
COMMUNITY
Start: 2021-07-27

## 2021-07-30 NOTE — PROGRESS NOTES
Chief Complaint   Patient presents with    Knee Pain     f/u 2 wk left knee ACL-R           Subjective:        Monserrat Vargas is here for follow-up after left knee arthroscopy. Findings at surgery: ACL tear, OA medial femoral condyle. Pain is controlled without any medications. The patient denies fever, wound drainage, increasing redness, pus, increasing pain, increasing swelling. Post op problems reported: none. He is ambulating with crutch use. Objective:           General :    alert, appears stated age and cooperative   Gait:  Antalgic. Sutures:   Sutures in place. Incision:  healing well, no significant drainage, no dehiscence, no significant erythema   Tenderness:  mild   Flexion ROM:  diminished range of motion   Extension ROM:  diminished range of motion   Effusion:  moderate   DVT Evaluation:  No evidence of DVT seen on physical exam.  Negative Luz's sign. No cords or calf tenderness. No significant calf/ankle edema. Assessment:     Jorge Oneal was seen today for knee pain. Diagnoses and all orders for this visit:    Rupture of anterior cruciate ligament of left knee, initial encounter  -     Cancel: Amb External Referral To Physical Therapy  -     [address] [place] [address]           Plan:      Surgical pictures from the surgery were reveiwed with the patient  Sutures removed today. Begin local wound cares. Wound care discussed. Range of motion and rehabilitation exercises discussed with the patient. Physical Therapy for post-operative rehabilitation. Full weight bearing. Follow up: 4 weeks.        Kayode Ashley DO

## 2021-07-30 NOTE — PATIENT INSTRUCTIONS
REHABILITATION PROTOCOL: ACL REHABILITATION PROTOCOL       I. Immediate Post-Op          A. Knee brace locked at 0 °        B. Cryo cuff/ Ice       C. Patient instructed in performance of ROM exercises: 2-3 times daily            1. Towel extension: obtain passive extension at least 30 minutes by propping                     heel up on pillows. 2. Passive flexion  off side of bed. 3. Use Good leg assistance for passive ROM out of brace. 4. Prone extension stretch        D. Isometric hamstrings        E. Isometric quads at > 45°. Quad sets, SLR at 0°        F. SLR for quad control        G. Co-contractions        H. Ankle pumps        I. Weight bearing as tolerated with crutches (except meniscal repair)        J. Meniscal Repair Precautions:             1. Non-weight bearing for 4-6 weeks             2. Limit flexion to 90° for first 4 weeks     II. Days 3-4 to 2-3 weeks          A. CPM for home use if/when prescribed ( 2 hour sessions, 3 x per day- slow speed             for 2 weeks) Start at the degrees of flexion obtained in hospital. Progressive                    increase in increments of flexion. Increase 10° increments per day up to 90°, then           increase in 5° increments as tolerated. 1. Goal: 0-100 of flexion (emphasis on full extension)        B. Outpatient P.T.: 3 times per week        C. Continue cyrocuff as needed        D. Home exercises: 3 times per day             1. Towel extensions             2. Prone passive stretching (if necessary for extension)             3. Wall & heel slides for flexion (or comparable flexion exercises)             4. Active assisted knee flexion only             5. Good leg assist passive extension             6. Isotonic hamstrings             7. Open chain quads 90° - 45° (if tolerated)        E. WB as tolerated with crutches (unless meniscal repair)        F. Brace at 0° for ambulating and sleeping        G. Cryocuff after exercises or if swelling occurs        H. EMS Protocol: High Voltage, co-contractions     III. Weeks 3-6          A. Fit with Protective De-rotation Brace (0-100° minimum): week 2-4: depending on              individual case. Exception: Meniscal repair stays in [place] for 6 weeks. Jagjit Caprice down on one crutch or cane        C. EMS Protocol        D. Continue ROM exercises as before with home program        E. Water Conditioning (can start earlier if there is complete wound healing)        F. Swimming- well leg kick only        G. Stationary Biking, Stairmaster, ¼ Squats        H. Start easy closed-chain quad exercises        I. Isotonic Hamstring        J. Quads 90-30 (if no patellar irritation)        K. Stress Full Extension. Should have 120° of flexion by 6 weeks     IV. Weeks 6-12          A. Most strengthening exercises for quadriceps are to be closed kinetic chain          exercises             1. Bilateral knee bends; progress to unilateral knee bends             2. Calf raises (double to single leg)             3. Leg press machine (90- 10)             4. One leg balancing        B. Brace for high risk situations only        C. Wean off crutch or cane        D. Stationary Biking (for ROM first, then gradually add resistance)        E. Continue heel slides for flexion at home        F. Passive flexion stretch        G. Water conditioning        H. Swimming (flutter kick only)        I. Increase hamstrings        J. Strengthening        K. If quad strength > 70% - rope jumping ( with brace ), leg press, hip slide, squat          rack (90- 10), stairmaster        L. Discontinue De-rotation brace for ADLs     V. 3-5 Months          A. Cycling & swimming (progress to road bike)        B. Isokinetics at 5-6 Months with extension stop of 20 (no low angular velocities)        C. Jogging at 3-4 months (if quad strength > 70 %)        D.  Proprioception exercises (balance board)        E. Leg Press        F. Strength Test, KT  1000. If > 70% strength, lateral shuffles, cariocas, jumping                rope, isotonics with 20 extension stop        G. Closed-chain quad strengthening with increasing resistance     VI. 5- 12 Months          A. Progressive Strengthening without restrictions on extension        B. Proprioceptive exercise        C. Sports specific activities        D. Biking and swimming        E. Return to all sports (per physicians instructions)        F. Protective brace for pivoting and jumping activities until 24 months        G. Running sports to cutting sports        H. Jumping sports last        I. Serial isokinetic testing as indicated        J. Running program with emphasis on agility and power        K.  Functional activities             1. side- cutting             2. back pedaling             3. cutting             4. plyometrics

## 2021-08-02 ENCOUNTER — EVALUATION (OUTPATIENT)
Dept: PHYSICAL THERAPY | Age: 31
End: 2021-08-02
Payer: MEDICAID

## 2021-08-02 DIAGNOSIS — S83.512A RUPTURE OF ANTERIOR CRUCIATE LIGAMENT OF LEFT KNEE, INITIAL ENCOUNTER: Primary | ICD-10-CM

## 2021-08-02 PROCEDURE — 97162 PT EVAL MOD COMPLEX 30 MIN: CPT | Performed by: PHYSICAL THERAPIST

## 2021-08-02 NOTE — PROGRESS NOTES
Physical Therapy Treatment Note    Date: 2021  Patient Name: Alta Orona  : 1990   MRN: [id number]  DOInjury: May 2021  DOSx: ACL Reconstruction 21   Referring Provider: Kelsy Bruce DO  [address] Diagnosis:    Diagnosis Orders   1. Rupture of anterior cruciate ligament of left knee, initial encounter         Outcome Measure:  Modified Oswestry 69% disability    S: see eval  O:  Time 805-840     Visit 1 Repeat outcome measure at mid point and end. Surgical Date 2021     Pain 3           Stretching perform at 8-9 weeks post op      ITB Stretch   TE   HS Stretch   TE   Quad Stretch   TE   Calf Stretch             Exercise      Nustep   TE   Yoga Stretch   TE         Week 2      Heel Slides  HEP  TE   QS HEP  TE   HS HEP  TE   SLR HEP  TE   LAQ HEP           Week 3-4      HS curls  TE   Step ups FWD  TA   Marching      Hip Abduction      Calf Raises      1/4 Squats            Week 4-6      [place]    TA   Step Downs   TA   SL balance    NMR   TKE    TE   Full squat to 90  No weight   TA   SL Squat   No weight   TA   TG Squats  TA   TG Calf Raises  TA         Week 6-8      SL activitys       Lateral Step ups   TA   Resisted Walking    TA         Week 8-12      SB HS curls       Step ups FWD on BOSU      Step ups Lateral on BOSU      BOSU Squats DBL      Ecc Heel taps       Steam boats   NMR   3 point cone taps    NMR   Step off 6\" step into SLS onto foam       Lunges (Fwd/Lateral)      Walking lunges       Resisted ambulation FWD/LAT/BWD            Plyometrics/agility:       Squat jumps       Scissor Jumps       Fwd line hops       Quick lateral step up on BOSU      Toe Taps on BOSU      Lateral line hops       Agility Ladder  DBL to DBL  DBL to SL   In and out     Resisted jogging       Resisted high knees         Resisted BWD walking c squat jumps coming fwd         A:  Tolerated well. Above added to written HEP.   Discussed anatomy, physiology, body mechanics, principles of loading, and progressive loading/activity.   P: Continue with rehab plan  Sia Campoverde PT    Treatment Charges: Mins Units   Initial Evaluation 35 1   Re-Evaluation     Ther Exercise         TE     Manual Therapy     MT     Ther Activities        TA     Gait Training          GT     Neuro Re-education NR     Modalities     Non-Billable Service Time     Other     Total Time/Units 35 1

## 2021-08-02 NOTE — PROGRESS NOTES
[facility] OUTPATIENT REHABILITATION  PHYSICAL THERAPY INITIAL EVALUATION         Date:  2021   Patient: Samir Nixon  : 1990  MRN: [id number]  Referring Provider: Carey Polanco DO   Elias Otero [address] [place] [address]     Medical Diagnosis:      Diagnosis Orders   1. Rupture of anterior cruciate ligament of left knee, initial encounter         SUBJECTIVE:     Surgical procedure: Procedure:  1.  Left knee ACL arthroscopic reconstruction  2.  Left knee arthroscopic chondroplasty medial tibial condyle    Date of surgery: 21    Services provided following surgery: None    History: Pt stated that he tore his ACL and fractured his tibial plateau 2 weeks prior to eval attempting to get out of his car. Pt stated that he has surgery planned for 3 weeks and is attending therapy in order to improve ROM and strength before sx. Chief complaint: pain, decreased motion, decreased mobility, weakness    Pain:   Current: 2/10      Worst: 6/10    Symptom Type/Quality: aching, sharp  Location[de-identified] Knee: anterior, suprapatellar, infrapatellar     Imaging results: No results found. Past Medical History:  No past medical history on file.   Past Surgical History:   Procedure Laterality Date    FRACTURE SURGERY      acl repair left    KNEE SURGERY Left 2021    LEFT KNEE ARTHROSCOPY, ANTERIOR CRUCIATE LIGAMENT RECONSTRUCTION (ALLGRAFT), DEBRIDEMENT (ARTHREX) performed by Carey Polanco DO at [address]       Medications:   Current Outpatient Medications   Medication Sig Dispense Refill    buprenorphine-naloxone (SUBOXONE) 8-2 MG FILM SL film       aspirin 325 MG EC tablet Take 1 tablet by mouth daily for 14 days 14 tablet 0    docusate sodium (COLACE) 100 MG capsule Take 1 capsule by mouth 2 times daily as needed for Constipation 20 capsule 1    ketorolac (TORADOL) 10 MG tablet Take 1 tablet by mouth every 6 hours as needed for Pain Patient received prior injection 20 tablet 0  ondansetron (ZOFRAN) 4 MG tablet Take 1 tablet by mouth every 8 hours as needed for Nausea or Vomiting 20 tablet 0     No current facility-administered medications for this visit. Occupation: construction. Physical demands include: lifting, carrying, pushing, pulling medium weighted items, lifting, carrying, pushing, pulling heavy weighted items, lifting, carrying, pushing, pulling light weighted items, walking, standing. Status: full time. Exercise regimen: none    Hobbies: yard work, working around [email or web address], [employer], return to work    Patient Goals: pain relief, return to prior activity, get back to normal    Precautions/Contraindications: none    OBJECTIVE:     Estimated body mass index is 27.37 kg/m² as calculated from the following:    Height as of 7/30/21: 5' 8\" (1.727 m). Weight as of 7/30/21: 180 lb (81.6 kg). Observations: Well nourished male with normal affect. Rises carefully from chair. Ambulates with one crutch  on same side as injury. Instructed that AD should be on opposite side of injured leg but pt stated that he feels more comfortable with it on L side. Inspection: Incision edges approximated, no purulent drainage, no redness, no swelling, no tenderness and not hot to touch. Edema: mild global    Gait: antalgic gait, ambulates with 1 crutch     Joint/Motion:    Knee:  Right:   AROM: WNL° Flexion,  WNL° Extension  PROM: WNL° Flexion,  WNL° Extension  Left:   AROM: 90° Flexion,  -23° Extension  PROM: 90 (no bending past 90 initially)° Flexion,  -20° Extension    Strength:    Knee:   Right: Flexion 5/5,  Extension 5/5  Left: Flexion 3-/5,  Extension 3-/5    Palpation: Tender to palpation at anterior thigh/knee.     ASSESSMENT     Outcome Measure:   Lower Extremity Functional Scale (LEFS) 69% impairment    Problems:    Pain reported 6/10   Mild edema   ROM decreased (see above)   Strength decreased (3-/5 L knee)   Decreased functional ability with walking, stairs, standing, work, use of left lower extremity, bending, lifting, carrying    [x] There are no barriers affecting plan of care or recovery    [] Barriers to this patient's plan of care or recovery include:    Domestic Concerns:  [x] No  [] Yes:    Short Term goals (8-10 weeks)   Pain 3/10   ROM WFL   Strength 4-/5 L knee    Able to perform / complete the following functions / tasks: ADLs, ambulate without assistive device, normal stair negotiation , hobbies, yard work, reach / lift / carry light weighted items in performance of home or work demands, return to exercise regimen  with less pain.  Lower Extremity Functional Scale (LEFS) 45% impairment    Long Term goals (16-20 weeks)   Pain 0-3/10   ROM WNL   Strength 4+/5 L knee   Able to perform / complete the following functions / tasks: ADLs, ambulate without assistive device, normal stair negotiation , hobbies, yard work, reach / lift / carry medium weighted items in performance of home or work demands, return to exercise regimen  with no/minimal pain.  LEFS 0-25% impairment   Independent with home exercise program (HEP)    Rehab Potential: [x] Good  [] Fair  [] Poor    PLAN     Pt to benefit from skilled PT services in order to improve ROM, strength, functional mobility, endurance, and decrease pain in L knee. Pt is stiff with both knee flexion and extension with the greater limitation being with L knee extension. Gentle ROM exercises to be performed initially to increase functional ROM in both directions while pt still in the protective phase following surgery. Pt to be progressed per guidelines in treatment note with regards to stretches, strengthening activities, and functional and endurance/balance activities.       Treatment Plan:  [x] Therapeutic Exercise  [x] Therapeutic Activity  [x] Neuromuscular Re-education   [x] Gait Training  [x] Balance Training  [] Aerobic conditioning  [] Manual Therapy  [] Massage / Fascial release   [] Work / Sport specific activities    [] Pain Neuroscience [x] Cold / hot pack  [x] Vasocompression  [] Electrical Stimulation  [] Lumbar / Cervical Traction  [] Ultrasound   [] Iontophoresis: 4 mg/mL Dexamethasone Sodium Phosphate 40-80 mAmin        [x] Instruction in HEP      []  Medication allergies reviewed for use of Dexamethasone Sodium Phosphate 4mg/ml  with iontophoresis treatments. Patient is not allergic. The following CPT codes are likely to be used in the care of this patient: 74226 PT Evaluation: Moderate Complexity , 50737 PT Re-Evaluation , 93887 Therapeutic Exercise , 58829 Neuromuscular Re-Education , 96929 Therapeutic Activities , 88177 Manual Therapy , 52468 Gait Training , 93695 Vasopneumatic Device ,  Electrical Stimulation      Suggested Professional Referral: [x] No  [] Yes:     Patient Education:  [x] Plans / Goals, Risks / Benefits discussed  [x] Home exercise program  Method of Education: [x] Verbal  [x] Demo  [x] Written  Comprehension of Education:  [x] Verbalizes understanding. [x] Demonstrates understanding. [] Needs Review. [] Demonstrates / verbalizes understanding of HEP / Job Jose Raul previously given. Frequency:  1-2 days per week for 16-20 weeks    Patient understands diagnosis/prognosis and consents to treatment, plan and goals: [x] Yes    [] No     Thank you for the opportunity to work with your patient. If you have questions or comments, please contact me at [phone]; fax: [phone]. Electronically signed by: Radha Dickens PT         Please sign Physician's Certification and return to: [address] [place], [place] [address]  Dept: [phone]  Dept Fax: [phone] Certification / Comments     Frequency/Duration 1-2 days per week for 16-20 weeks. Certification period from 8/2/2021  to 11/01/2021.     I have reviewed the Plan of Care established for skilled therapy services and certify that the services are required and that they will be provided while the patient is under my care.     Physician's Comments/Revisions:               Physician's Printed Name:                                           [de-identified] Signature:                                                               Date:

## 2021-08-04 ENCOUNTER — TREATMENT (OUTPATIENT)
Dept: PHYSICAL THERAPY | Age: 31
End: 2021-08-04
Payer: MEDICAID

## 2021-08-04 DIAGNOSIS — S83.512A RUPTURE OF ANTERIOR CRUCIATE LIGAMENT OF LEFT KNEE, INITIAL ENCOUNTER: Primary | ICD-10-CM

## 2021-08-04 PROCEDURE — 97110 THERAPEUTIC EXERCISES: CPT

## 2021-08-04 NOTE — PROGRESS NOTES
Physical Therapy Treatment Note    Date: 2021  Patient Name: Stephen Almanzar  : 1990   MRN: [id number]  DOInjury: May 2021  DOSx: ACL Reconstruction 21   Referring Provider:  Raphael Hernandez DO  [address] Diagnosis:    Diagnosis Orders   1. Rupture of anterior cruciate ligament of left knee, initial encounter         Outcome Measure:  Modified Oswestry 69% disability    S: Pt reports knee is tight but ok. O: Ambulating with one crutch, prefers to use crutch on surgical side  Time 2830-4761     Visit 2/ Repeat outcome measure at mid point and end.     Surgical Date 2021     Pain 3/10           Stretching perform at 8-9 weeks post op      ITB Stretch   TE   HS Stretch   TE   Quad Stretch   TE   Calf Stretch       Heel prop 2 x 3 min           Exercise      Nustep L5 x 10 min To 90* TE   Yoga Stretch   TE         Week 2      Heel Slides  2 x 20 to 90*  TE   QS X 20 with 10 sec hold  Towel under knee TE   HS   TE   SLR 3 x 10 with 10 heel slides in between sets  TE   LAQ            Week 3-4      HS curls NEXT TE   Step ups FWD  TA   Marching      Hip Abduction      Calf Raises NEXT     1/4 Squats            Week 4-6      [place]    TA   Step Downs   TA   SL balance    NMR   TKE    TE   Full squat to 90  No weight   TA   SL Squat   No weight   TA   TG Squats  TA   TG Calf Raises  TA         Week 6-8      SL activitys       Lateral Step ups   TA   Resisted Walking    TA         Week 8-12      SB HS curls       Step ups FWD on BOSU      Step ups Lateral on BOSU      BOSU Squats DBL      Ecc Heel taps       Steam boats   NMR   3 point cone taps    NMR   Step off 6\" step into SLS onto foam       Lunges (Fwd/Lateral)      Walking lunges       Resisted ambulation FWD/LAT/BWD            Plyometrics/agility:       Squat jumps       Scissor Jumps       Fwd line hops       Quick lateral step up on BOSU      Toe Taps on BOSU      Lateral line hops       Agility Ladder DBL to [address]  DBL to SL   In and out     Resisted jogging       Resisted high knees         Resisted BWD walking c squat jumps coming fwd         A:  Tolerated well. Knee extension limited. Discussed anatomy, physiology, body mechanics, principles of loading, and progressive loading/activity.   P: Continue with rehab plan  Surya Stephens PTA    Treatment Charges: Mins Units   Initial Evaluation     Re-Evaluation     Ther Exercise         TE 40 3   Manual Therapy     MT     Ther Activities        TA     Gait Training          GT     Neuro Re-education NR     Modalities     Non-Billable Service Time     Other     Total Time/Units 40 3

## 2021-08-09 ENCOUNTER — TREATMENT (OUTPATIENT)
Dept: PHYSICAL THERAPY | Age: 31
End: 2021-08-09
Payer: MEDICAID

## 2021-08-09 DIAGNOSIS — S83.512A RUPTURE OF ANTERIOR CRUCIATE LIGAMENT OF LEFT KNEE, INITIAL ENCOUNTER: Primary | ICD-10-CM

## 2021-08-09 PROCEDURE — 97110 THERAPEUTIC EXERCISES: CPT | Performed by: PHYSICAL THERAPIST

## 2021-08-09 NOTE — PROGRESS NOTES
Physical Therapy Treatment Note    Date: 2021  Patient Name: Laura Keen  : 1990   MRN: [id number]  DOInjury: May 2021  DOSx: ACL Reconstruction 21   Referring Provider:  Akash Guevara DO  [address] Diagnosis:    Diagnosis Orders   1. Rupture of anterior cruciate ligament of left knee, initial encounter         Outcome Measure:  Modified Oswestry 69% disability    S: Pt reports knee is feeling better but is still tight. O: Pt came to appointment without any crutches  Time 5515-0344     Visit 3/ Repeat outcome measure at mid point and end.     Surgical Date 2021     Pain 2/10           Stretching perform at 8-9 weeks post op      ITB Stretch   TE   HS Stretch   TE   Quad Stretch   TE   Calf Stretch       Heel prop 2 x 3 min           Exercise      Nustep L5 x 10 min To 90* TE   Yoga Stretch   TE         Week 2      Heel Slides  2 x 20 to 90*  TE   QS X 20 with 10 sec hold  Towel under knee TE   HS   TE   SLR 3 x 10 with 10 heel slides in between sets  TE   LAQ            Week 3-4      HS curls 15 reps x 1 set each leg TE   Step ups FWD  TA   Marching 15 reps x 1 set each leg     Hip Abduction 15 reps x 1 set each leg     Calf Raises 15 reps x 1 set     1/4 Squats 15 reps x 1 set           Week 4-6      [place]    TA   Step Downs   TA   SL balance    NMR   TKE    TE   Full squat to 90  No weight   TA   SL Squat   No weight   TA   TG Squats  TA   TG Calf Raises  TA         Week 6-8      SL activitys       Lateral Step ups   TA   Resisted Walking    TA         Week 8-12      SB HS curls       Step ups FWD on BOSU      Step ups Lateral on BOSU      BOSU Squats DBL      Ecc Heel taps       Steam boats   NMR   3 point cone taps    NMR   Step off 6\" step into SLS onto foam       Lunges (Fwd/Lateral)      Walking lunges       Resisted ambulation FWD/LAT/BWD            Plyometrics/agility:       Squat jumps       Scissor Jumps       Fwd line hops Quick lateral step up on BOSU      Toe Taps on BOSU      Lateral line hops       Agility Ladder  DBL to DBL  DBL to SL   In and out     Resisted jogging       Resisted high knees         Resisted BWD walking c squat jumps coming fwd         A:  Tolerated well, pt demonstrated improved knee extension but still has antalgic and abnormal gait during ambulation.    P: Continue with rehab plan  Chelo Toro PT    Treatment Charges: Mins Units   Initial Evaluation     Re-Evaluation     Ther Exercise         TE 40 3   Manual Therapy     MT     Ther Activities        TA     Gait Training          GT     Neuro Re-education NR     Modalities     Non-Billable Service Time     Other     Total Time/Units 40 3

## 2021-08-11 ENCOUNTER — TREATMENT (OUTPATIENT)
Dept: PHYSICAL THERAPY | Age: 31
End: 2021-08-11
Payer: MEDICAID

## 2021-08-11 DIAGNOSIS — S83.512A RUPTURE OF ANTERIOR CRUCIATE LIGAMENT OF LEFT KNEE, INITIAL ENCOUNTER: Primary | ICD-10-CM

## 2021-08-11 PROCEDURE — 97110 THERAPEUTIC EXERCISES: CPT | Performed by: PHYSICAL THERAPIST

## 2021-08-11 NOTE — PROGRESS NOTES
Physical Therapy Treatment Note    Date: 2021  Patient Name: Stephen Almanzar  : 1990   MRN: [id number]  DOInjury: May 2021  DOSx: ACL Reconstruction 21   Referring Provider:  Raphael Hernandez DO  [address] Diagnosis:    Diagnosis Orders   1. Rupture of anterior cruciate ligament of left knee, initial encounter         Outcome Measure:  Modified Oswestry 69% disability    S: Pt reports knee is feeling better but is still tight especially into extension. O: Pt came to appointment without any crutches  Time 5582-8372     Visit 4/ Repeat outcome measure at mid point and end.     Surgical Date 2021     Pain 2/10     Patellar Mobilizations 30 x 2     Stretching perform at 8-9 weeks post op      ITB Stretch   TE   HS Stretch   TE   Quad Stretch   TE   Calf Stretch       Heel prop 5 mins           Exercise      Nustep L5 x 10 min To 90* TE   Yoga Stretch   TE         Week 2      Heel Slides  2 x 20 to 90*  TE   QS X 20 with 10 sec hold  Towel under knee TE   HS   TE   SLR 3 x 10 with 10 heel slides in between sets  TE   LAQ            Week 3-4      HS curls 15 reps x 1 set each leg TE   Step ups FWD 15 reps x 1 set each leg TA   Marching 15 reps x 1 set each leg     Hip Abduction 15 reps x 1 set each leg     Calf Raises 15 reps x 1 set     1/4 Squats 15 reps x 1 set           Week 4-6      [place]    TA   Step Downs   TA   SL balance    NMR   TKE    TE   Full squat to 90  No weight   TA   SL Squat   No weight   TA   TG Squats  TA   TG Calf Raises  TA         Week 6-8      SL activitys       Lateral Step ups   TA   Resisted Walking    TA         Week 8-12      SB HS curls       Step ups FWD on BOSU      Step ups Lateral on BOSU      BOSU Squats DBL      Ecc Heel taps       Steam boats   NMR   3 point cone taps    NMR   Step off 6\" step into SLS onto foam       Lunges (Fwd/Lateral)      Walking lunges       Resisted ambulation FWD/LAT/BWD Plyometrics/agility:       Squat jumps       Scissor Jumps       Fwd line hops       Quick lateral step up on BOSU      Toe Taps on BOSU      Lateral line hops       Agility Ladder  DBL to DBL  DBL to SL   In and out     Resisted jogging       Resisted high knees         Resisted BWD walking c squat jumps coming fwd         A:  Tolerated well, pt demonstrated improved knee extension but still has antalgic and abnormal gait during ambulation. Pt instructed to consistently perform HEP knee extension exercises and to try and normalize knee extension within first 4 weeks.    P: Continue with rehab plan  Rubi Venegas PT    Treatment Charges: Mins Units   Initial Evaluation     Re-Evaluation     Ther Exercise         TE 40 3   Manual Therapy     MT     Ther Activities        TA     Gait Training          GT     Neuro Re-education NR     Modalities     Non-Billable Service Time     Other     Total Time/Units 40 3

## 2021-08-16 ENCOUNTER — TREATMENT (OUTPATIENT)
Dept: PHYSICAL THERAPY | Age: 31
End: 2021-08-16
Payer: MEDICAID

## 2021-08-16 DIAGNOSIS — S83.512A RUPTURE OF ANTERIOR CRUCIATE LIGAMENT OF LEFT KNEE, INITIAL ENCOUNTER: Primary | ICD-10-CM

## 2021-08-16 PROCEDURE — 97110 THERAPEUTIC EXERCISES: CPT

## 2021-08-16 NOTE — PROGRESS NOTES
Physical Therapy Treatment Note    Date: 2021  Patient Name: Ronnie Morris  : 1990   MRN: [id number]  DOInjury: May 2021  DOSx: ACL Reconstruction 21   Referring Provider:  Farooq Ortiz DO  [address] Diagnosis:    Diagnosis Orders   1. Rupture of anterior cruciate ligament of left knee, initial encounter         Outcome Measure:  Modified Oswestry 69% disability    S: Pt reports knee is feeling better but is still tight especially into extension. O: Pt came to appointment without any crutches  Time 0558-0837     Visit 4/ Repeat outcome measure at mid point and end.     Surgical Date 2021     Pain 2/10     Patellar Mobilizations      Stretching perform at 8-9 weeks post op      ITB Stretch   TE   HS Stretch   TE   Quad Stretch   TE   Calf Stretch       Heel prop 7 mins           Exercise      Nustep L5 x 10 min To 90* TE   Yoga Stretch   TE         Week 2      Heel Slides  2 x 20 to 90*  TE   QS X 20 with 10 sec hold  Towel under knee TE   HS   TE   SLR 2 x 20  TE   LAQ            Week 3-4      HS curls  TE   Step ups FWD 6\" x 20 TA   Marching X 20     Hip Abduction X 20     Calf Raises 2 x 10     1/4 Squats  x 20           Week 4-6      [place]    TA   Step Downs   TA   SL balance    NMR   TKE    TE   Full squat to 90  No weight   TA   SL Squat   No weight   TA   TG Squats  TA   TG Calf Raises  TA         Week 6-8      SL activitys       Lateral Step ups   TA   Resisted Walking    TA         Week 8-12      SB HS curls       Step ups FWD on BOSU      Step ups Lateral on BOSU      BOSU Squats DBL      Ecc Heel taps       Steam boats   NMR   3 point cone taps    NMR   Step off 6\" step into SLS onto foam       Lunges (Fwd/Lateral)      Walking lunges       Resisted ambulation FWD/LAT/BWD            Plyometrics/agility:       Squat jumps       Scissor Jumps       Fwd line hops       Quick lateral step up on BOSU      Toe Taps on BOSU      Lateral line hops       Agility Ladder  DBL to [address] [email or web address]  DBL to SL   In and out     Resisted jogging       Resisted high knees         Resisted BWD walking c squat jumps coming fwd         A:  Tolerated well.  Pt continues to lack full extension  P: Continue with rehab plan  Jeffory Boast, PTA    Treatment Charges: Mins Units   Initial Evaluation     Re-Evaluation     Ther Exercise         TE 40 3   Manual Therapy     MT     Ther Activities        TA     Gait Training          GT     Neuro Re-education NR     Modalities     Non-Billable Service Time     Other     Total Time/Units 40 3

## 2021-08-20 ENCOUNTER — TREATMENT (OUTPATIENT)
Dept: PHYSICAL THERAPY | Age: 31
End: 2021-08-20
Payer: MEDICAID

## 2021-08-20 DIAGNOSIS — S83.512A RUPTURE OF ANTERIOR CRUCIATE LIGAMENT OF LEFT KNEE, INITIAL ENCOUNTER: Primary | ICD-10-CM

## 2021-08-20 DIAGNOSIS — S82.132A CLOSED FRACTURE OF MEDIAL PORTION OF LEFT TIBIAL PLATEAU, INITIAL ENCOUNTER: ICD-10-CM

## 2021-08-20 PROCEDURE — 97110 THERAPEUTIC EXERCISES: CPT

## 2021-08-20 NOTE — PROGRESS NOTES
Plyometrics/agility:       Squat jumps       Scissor Jumps       Fwd line hops       Quick lateral step up on BOSU      Toe Taps on BOSU      Lateral line hops       Agility Ladder  DBL to DBL  DBL to SL   In and out     Resisted jogging       Resisted high knees         Resisted BWD walking c squat jumps coming fwd         A:  Tolerated well.  Pt continues to lack full extension, ambulates with antalgic gait  P: Continue with rehab plan  Surya Stephens PTA    Treatment Charges: Mins Units   Initial Evaluation     Re-Evaluation     Ther Exercise         TE 40 3   Manual Therapy     MT     Ther Activities        TA     Gait Training          GT     Neuro Re-education NR     Modalities     Non-Billable Service Time     Other     Total Time/Units 40 3

## 2021-08-27 ENCOUNTER — TREATMENT (OUTPATIENT)
Dept: PHYSICAL THERAPY | Age: 31
End: 2021-08-27
Payer: MEDICAID

## 2021-08-27 ENCOUNTER — OFFICE VISIT (OUTPATIENT)
Dept: ORTHOPEDIC SURGERY | Age: 31
End: 2021-08-27

## 2021-08-27 VITALS — TEMPERATURE: 98 F | WEIGHT: 180 LBS | HEIGHT: 68 IN | BODY MASS INDEX: 27.28 KG/M2

## 2021-08-27 DIAGNOSIS — S83.512A RUPTURE OF ANTERIOR CRUCIATE LIGAMENT OF LEFT KNEE, INITIAL ENCOUNTER: Primary | ICD-10-CM

## 2021-08-27 PROCEDURE — 97110 THERAPEUTIC EXERCISES: CPT

## 2021-08-27 PROCEDURE — 99024 POSTOP FOLLOW-UP VISIT: CPT | Performed by: ORTHOPAEDIC SURGERY

## 2021-08-27 NOTE — PROGRESS NOTES
Physical Therapy Treatment Note    Date: 2021  Patient Name: Lisadnra Davis  : 1990   MRN: [id number]  DOInjury: May 2021  DOSx: ACL Reconstruction 21   Referring Provider:  Ryan Harvey DO   [address] Diagnosis:    Diagnosis Orders   1. Rupture of anterior cruciate ligament of left knee, initial encounter         Outcome Measure:  Modified Oswestry 69% disability    S: Pt reports knee is feeling better but is still tight especially into extension. O: Pt came to appointment without any crutches  Time 4124-7718     Visit 7/ Repeat outcome measure at mid point and end.     Surgical Date 2021     Pain 2/10 at most     * Flex, -5* Ext 21    Patellar Mobilizations      Stretching perform at 8-9 weeks post op      ITB Stretch   TE   HS Stretch   TE   Quad Stretch   TE   Calf Stretch       Heel prop 5# x 10 mins           Exercise      Nustep L5 x 10 min  TE   Yoga Stretch   TE         Week 2      Heel Slides   TE   QS  TE   HS   TE   SLR   TE   LAQ            Week 3-4      HS curls  TE   Step ups FWD 14\" 2 x 20 TA   Marching     Hip Abduction     Calf Raises 2 x 20     1/4 Squats            Week 4-6      [place]    TA   Step Downs 7\" 2 x 20  TA   SL balance    NMR   TKE  Green band 2 x 10 hold 5 sec  TE   BAPS L3 2 x 20 ea dir F/B, L/R, CW/CCW    Full squat to 90  No weight 2 x 20  TA   SL Squat   No weight   TA   TG Squats L17 3 x 20 TA   TG Calf Raises  TA         Week 6-8      SL activitys       Lateral Step ups   TA   Resisted Walking    TA         Week 8-12      SB HS curls       Step ups FWD on BOSU      Step ups Lateral on BOSU      BOSU Squats DBL      Ecc Heel taps       Steam boats   NMR   3 point cone taps    NMR   Step off 6\" step into SLS onto foam       Lunges (Fwd/Lateral)      Walking lunges       Resisted ambulation FWD/LAT/BWD            Plyometrics/agility:       Squat jumps       Scissor Jumps       Fwd line hops

## 2022-01-21 ENCOUNTER — OFFICE VISIT (OUTPATIENT)
Dept: ORTHOPEDIC SURGERY | Age: 32
End: 2022-01-21
Payer: MEDICAID

## 2022-01-21 VITALS — HEIGHT: 68 IN | BODY MASS INDEX: 27.28 KG/M2 | TEMPERATURE: 98 F | WEIGHT: 180 LBS

## 2022-01-21 DIAGNOSIS — M25.462 EFFUSION OF LEFT KNEE: ICD-10-CM

## 2022-01-21 DIAGNOSIS — S83.512A RUPTURE OF ANTERIOR CRUCIATE LIGAMENT OF LEFT KNEE, INITIAL ENCOUNTER: Primary | ICD-10-CM

## 2022-01-21 DIAGNOSIS — S80.02XA CONTUSION OF LEFT KNEE, INITIAL ENCOUNTER: ICD-10-CM

## 2022-01-21 PROCEDURE — G8427 DOCREV CUR MEDS BY ELIG CLIN: HCPCS | Performed by: ORTHOPAEDIC SURGERY

## 2022-01-21 PROCEDURE — 99214 OFFICE O/P EST MOD 30 MIN: CPT | Performed by: ORTHOPAEDIC SURGERY

## 2022-01-21 PROCEDURE — 4004F PT TOBACCO SCREEN RCVD TLK: CPT | Performed by: ORTHOPAEDIC SURGERY

## 2022-01-21 PROCEDURE — G8484 FLU IMMUNIZE NO ADMIN: HCPCS | Performed by: ORTHOPAEDIC SURGERY

## 2022-01-21 PROCEDURE — G8419 CALC BMI OUT NRM PARAM NOF/U: HCPCS | Performed by: ORTHOPAEDIC SURGERY

## 2022-01-21 RX ORDER — ARIPIPRAZOLE 10 MG/1
TABLET ORAL
COMMUNITY
Start: 2022-01-20

## 2022-01-21 NOTE — PROGRESS NOTES
Chief Complaint   Patient presents with    Knee Pain     f/u left knee pain. Previosu ACL-R DOS: 7/19/21. Patient states 4 days ago he went to use the bathroom at night and knee gave out. C/o pain and swelling since injury. HPI:    Patient is 32 y.o. male complaining of new onset left knee pain. Previosu ACL-R DOS: 7/19/21. Patient states 4 days ago he went to use the bathroom at night when he slipped and knee gave out. C/o pain and swelling since injury. Patient has not been wearing any brace at this time. Denies injury elsewhere. ROS:    Skin: (-) rash,(-) psoriasis,(-) eczema, (-)skin cancer. Neurologic: (-)numbness, (-)tingling, (-)headaches, (-) LOC. Cardiovascular: (-) Chest pain, (-) swelling in legs/feet, (-) SOB, (-) cramping in legs/feet with walking. All other review of systems negative except stated above or in HPI      No past medical history on file.   Past Surgical History:   Procedure Laterality Date    FRACTURE SURGERY      acl repair left    KNEE SURGERY Left 7/19/2021    LEFT KNEE ARTHROSCOPY, ANTERIOR CRUCIATE LIGAMENT RECONSTRUCTION (ALLGRAFT), DEBRIDEMENT (ARTHREX) performed by Enrrique Larkin DO at [address]       Current Outpatient Medications:     ARIPiprazole (ABILIFY) 10 MG tablet, , Disp: , Rfl:     buprenorphine-naloxone (SUBOXONE) 8-2 MG FILM SL film, , Disp: , Rfl:     docusate sodium (COLACE) 100 MG capsule, Take 1 capsule by mouth 2 times daily as needed for Constipation, Disp: 20 capsule, Rfl: 1    aspirin 325 MG EC tablet, Take 1 tablet by mouth daily for 14 days, Disp: 14 tablet, Rfl: 0    ketorolac (TORADOL) 10 MG tablet, Take 1 tablet by mouth every 6 hours as needed for Pain Patient received prior injection, Disp: 20 tablet, Rfl: 0    ondansetron (ZOFRAN) 4 MG tablet, Take 1 tablet by mouth every 8 hours as needed for Nausea or Vomiting (Patient not taking: Reported on 1/21/2022), Disp: 20 tablet, Rfl: 0  No Known Allergies  Social History Socioeconomic History    Marital status:      Spouse name: Not on file    Number of children: Not on file    Years of education: Not on file    Highest education level: Not on file   Occupational History    Not on file   Tobacco Use    Smoking status: Current Every Day Smoker     Packs/day: 1.00     Types: Cigarettes    Smokeless tobacco: Never Used   Vaping Use    Vaping Use: Former   Substance and Sexual Activity    Alcohol use: Yes     Comment: rarely    Drug use: No     Comment: 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  Sexual activity: Yes     Partners: Female   Other Topics Concern    Not on file   Social History Narrative    Not on file     Social Determinants of Health     Financial Resource Strain:     Difficulty of Paying Living Expenses: Not on file   Food Insecurity:     Worried About Running Out of Food in the Last Year: Not on file    Chauncey of Food in the Last Year: Not on file   Transportation Needs:     Lack of Transportation (Medical): Not on file    Lack of Transportation (Non-Medical):  Not on file   Physical Activity:     Days of Exercise per Week: Not on file    Minutes of Exercise per Session: Not on file   Stress:     Feeling of Stress : Not on file   Social Connections:     Frequency of Communication with Friends and Family: Not on file    Frequency of Social Gatherings with Friends and Family: Not on file    Attends Restorationism Services: Not on file    Active Member of [address] [place] [address] or Organizations: Not on file    Attends Club or Organization Meetings: Not on file    Marital Status: Not on file   Intimate Partner Violence:     Fear of Current or Ex-Partner: Not on file    Emotionally Abused: Not on file    Physically Abused: Not on file    Sexually Abused: Not on file   Housing Stability:     Unable to Pay for Housing in the Last Year: Not on file    Number of [place] in the Last Year: Not on file    Unstable Housing in the Last Year: Not on file No family history on file. Physical Exam:    Temp 98 °F (36.7 °C)   Ht 5' 8\" (1.727 m)   Wt 180 lb (81.6 kg)   BMI 27.37 kg/m²     GENERAL: alert, appears stated age, cooperative, no acute distress    HEENT: Head is normocephalic, atraumatic. PERRLA. SKIN: Clean, dry, intact. There is not any cellulitis or cutaneous lesions noted in the upper extremities    PULMONARY: breathing is regular and unlabored, no acute distress    CV: The bilateral upper and lower extremities are warm and well-perfused with brisk capillary refill. 2+ pulses UE and LE bilateral.     PSYCHIATRY: Pleasant mood, appropriate behavior, follows commands    NEURO: Sensation is intact distally with light touch with no alteration. Motor exam of the upper extremities show elbow flexion and extension, wrist flexion and extension, and finger abduction grossly intact 5/5. Upper extremity reflexes are bilaterally symmetrical and within normal limits. LYMPH: No lymphedema present distally in upper or lower extremity. MUSCULOSKELETAL:     Left knee Exam:     mild effusion noted. No erythema/induration/fluctuance. Posterior medial joint line TTP. Stable to varus and valgus at 0 and 30 degrees of flexion. Negative Lachman's and posterior drawer. Negative patellar grind test and J sign. Compartments soft and compressible throughout leg. Active range of motion 0-120 with pain. Positive Carlie's positive Apley's, gait is antalgic        Imaging:  Imaging left knee reveals no acute osseous abnormality dislocation or fracture    Adolph Holcomb was seen today for knee pain. Diagnoses and all orders for this visit:    Rupture of anterior cruciate ligament of left knee, initial encounter  -     MRI KNEE LEFT WO CONTRAST; Future    Effusion of left knee    Contusion of left knee, initial encounter        Patient seen and examined. X-rays reviewed. Patient has little to no arthritis noted on x-rays today.   However patient's main

## 2022-02-01 ENCOUNTER — HOSPITAL ENCOUNTER (OUTPATIENT)
Dept: MRI IMAGING | Age: 32
Discharge: HOME | End: 2022-02-03
Payer: MEDICAID

## 2022-02-01 DIAGNOSIS — S83.512A RUPTURE OF ANTERIOR CRUCIATE LIGAMENT OF LEFT KNEE, INITIAL ENCOUNTER: ICD-10-CM

## 2022-02-01 PROCEDURE — 73721 MRI JNT OF LWR EXTRE W/O DYE: CPT

## 2022-02-08 ENCOUNTER — OFFICE VISIT (OUTPATIENT)
Dept: ORTHOPEDIC SURGERY | Age: 32
End: 2022-02-08
Payer: MEDICAID

## 2022-02-08 VITALS — HEIGHT: 68 IN | BODY MASS INDEX: 27.28 KG/M2 | WEIGHT: 180 LBS

## 2022-02-08 DIAGNOSIS — S83.207A TEAR OF MENISCUS OF LEFT KNEE, UNSPECIFIED MENISCUS, UNSPECIFIED TEAR TYPE, UNSPECIFIED WHETHER OLD OR CURRENT TEAR: ICD-10-CM

## 2022-02-08 DIAGNOSIS — M25.462 EFFUSION OF LEFT KNEE: Primary | ICD-10-CM

## 2022-02-08 DIAGNOSIS — S83.512A RUPTURE OF ANTERIOR CRUCIATE LIGAMENT OF LEFT KNEE, INITIAL ENCOUNTER: ICD-10-CM

## 2022-02-08 PROCEDURE — 4004F PT TOBACCO SCREEN RCVD TLK: CPT | Performed by: ORTHOPAEDIC SURGERY

## 2022-02-08 PROCEDURE — G8484 FLU IMMUNIZE NO ADMIN: HCPCS | Performed by: ORTHOPAEDIC SURGERY

## 2022-02-08 PROCEDURE — 99214 OFFICE O/P EST MOD 30 MIN: CPT | Performed by: ORTHOPAEDIC SURGERY

## 2022-02-08 PROCEDURE — G8427 DOCREV CUR MEDS BY ELIG CLIN: HCPCS | Performed by: ORTHOPAEDIC SURGERY

## 2022-02-08 PROCEDURE — G8419 CALC BMI OUT NRM PARAM NOF/U: HCPCS | Performed by: ORTHOPAEDIC SURGERY

## 2022-02-08 NOTE — PROGRESS NOTES
Chief Complaint   Patient presents with    Knee Pain     Left knee MRI follow up          HPI:    Patient is 32 y.o. male complaining of new onset left knee pain. Previosu ACL-R DOS: 7/19/21. Patient states 4 days ago he went to use the bathroom at night when he slipped and knee gave out. C/o pain and swelling since injury. Patient has not been wearing any brace at this time. Denies injury elsewhere. Follows up after MRI. ROS:    Skin: (-) rash,(-) psoriasis,(-) eczema, (-)skin cancer. Neurologic: (-)numbness, (-)tingling, (-)headaches, (-) LOC. Cardiovascular: (-) Chest pain, (-) swelling in legs/feet, (-) SOB, (-) cramping in legs/feet with walking. All other review of systems negative except stated above or in HPI      No past medical history on file. Past Surgical History:   Procedure Laterality Date    FRACTURE SURGERY      acl repair left    KNEE SURGERY Left 7/19/2021    LEFT KNEE ARTHROSCOPY, ANTERIOR CRUCIATE LIGAMENT RECONSTRUCTION (ALLGRAFT), DEBRIDEMENT (ARTHREX) performed by Krystal Pineda DO at [address]       Current Outpatient Medications:     ARIPiprazole (ABILIFY) 10 MG tablet, , Disp: , Rfl:     buprenorphine-naloxone (SUBOXONE) 8-2 MG FILM SL film, , Disp: , Rfl:   No Known Allergies  Social History     Socioeconomic History    Marital status:      Spouse name: Not on file    Number of children: Not on file    Years of education: Not on file    Highest education level: Not on file   Occupational History    Not on file   Tobacco Use    Smoking status: Current Every Day Smoker     Packs/day: 1.00     Types: Cigarettes    Smokeless tobacco: Never Used   Vaping Use    Vaping Use: Former   Substance and Sexual Activity    Alcohol use: Yes     Comment: rarely    Drug use: No     Comment: 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     Sexual activity: Yes     Partners: Female   Other Topics Concern    Not on file   Social History Narrative    Not on file     Social Determinants of Health     Financial Resource Strain:     Difficulty of Paying Living Expenses: Not on file   Food Insecurity:     Worried About Running Out of Food in the Last Year: Not on file    [place]un[place]y of Food in the Last Year: Not on file   Transportation Needs:     Lack of Transportation (Medical): Not on file    Lack of Transportation (Non-Medical): Not on file   Physical Activity:     Days of Exercise per Week: Not on file    Minutes of Exercise per Session: Not on file   Stress:     Feeling of Stress : Not on file   Social Connections:     Frequency of Communication with Friends and Family: Not on file    Frequency of Social Gatherings with Friends and Family: Not on file    Attends Rastafari Services: Not on file    Active Member of [address] [place] [address] [employer] or Organizations: Not on file    Attends Club or Organization Meetings: Not on file    Marital Status: Not on file   Intimate Partner Violence:     Fear of Current or Ex-Partner: Not on file    Emotionally Abused: Not on file    Physically Abused: Not on file    Sexually Abused: Not on file   Housing Stability:     Unable to Pay for Housing in the Last Year: Not on file    Number of [place] in the Last Year: Not on file    Unstable Housing in the Last Year: Not on file     No family history on file. Physical Exam:    Ht 5' 8\" (1.727 m)   Wt 180 lb (81.6 kg)   BMI 27.37 kg/m²     GENERAL: alert, appears stated age, cooperative, no acute distress    HEENT: Head is normocephalic, atraumatic. PERRLA. SKIN: Clean, dry, intact. There is not any cellulitis or cutaneous lesions noted in the upper extremities    PULMONARY: breathing is regular and unlabored, no acute distress    CV: The bilateral upper and lower extremities are warm and well-perfused with brisk capillary refill.  2+ pulses UE and LE bilateral.     PSYCHIATRY: Pleasant mood, appropriate behavior, follows commands    NEURO: Sensation is intact distally with light touch with no alteration. Motor exam of the upper extremities show elbow flexion and extension, wrist flexion and extension, and finger abduction grossly intact 5/5. Upper extremity reflexes are bilaterally symmetrical and within normal limits. LYMPH: No lymphedema present distally in upper or lower extremity. MUSCULOSKELETAL:     Left knee Exam:     mild effusion noted. No erythema/induration/fluctuance. Posterior medial joint line TTP. Stable to varus and valgus at 0 and 30 degrees of flexion. Negative Lachman's and posterior drawer. Negative patellar grind test and J sign. Compartments soft and compressible throughout leg. Active range of motion 0-120 with pain. Positive Carlie's positive Apley's, gait is antalgic    no change since last visit. Imaging:  Imaging left knee reveals no acute osseous abnormality dislocation or fracture    MRI KNEE LEFT WO CONTRAST    Result Date: 2/1/2022  EXAMINATION: MRI OF THE LEFT KNEE WITHOUT CONTRAST, 2/1/2022 7:29 am TECHNIQUE: Multiplanar multisequence MRI of the left knee was performed without the administration of intravenous contrast. COMPARISON: Radiographs dated 06/30/2021 and MRI dated 06/04/2021 HISTORY: ORDERING SYSTEM PROVIDED HISTORY: Rupture of anterior cruciate ligament of left knee, initial encounter FINDINGS: The posterior cruciate ligament is intact. Since previous, patient has undergone ACL reconstructive procedure. The graft appears diminutive. There may be some intact fibers but the findings are concerning for at least a high-grade partial tear. The collateral ligaments are intact although there are findings consistent with a mild medial collateral ligament sprain without definite tear. Quadriceps and patellar tendons are intact. Large joint effusion. No Baker's cyst.  Complex tear of the posterior horn of the medial meniscus extending both to the superior and inferior articular surface.   The superior articular surface extension is located relatively peripherally. Intact discoid lateral meniscus. Areas of ill-defined edema are identified throughout the medial femoral condyle and medial tibial plateau. Previously seen fractures are no longer identified. Minimal edema of the medial aspect of the patella. There is also some slight thickening of both the medial and lateral patellofemoral ligaments which otherwise appear intact. Complex tear of the medial meniscus. Interval ACL reconstruction. The graft is diminutive/attenuated, concerning for at least a high-grade partial-thickness tear. There may be some intact fibers. Large joint effusion. Areas of ill-defined edema in the medial tibial plateau, medial femoral condyle and medial aspect of the patella may represent areas of reactive edema or bone contusions. Mild sprain of the medial collateral ligament. RECOMMENDATIONS: Lexy Lin was seen today for knee pain. Diagnoses and all orders for this visit:    Effusion of left knee    Rupture of anterior cruciate ligament of left knee, initial encounter    Tear of meniscus of left knee, unspecified meniscus, unspecified tear type, unspecified whether old or current tear        Patient seen and examined. X-rays reviewed. Patient has little to no arthritis noted on x-rays today. However patient's main complaint is instability of symptomatic knee. Exam and history is consistent with possible meniscus tear. MRI recommended for further evaluation management. Follow-up after MRI      MRI reviewed with patient in detail. Natural history and course discussed with patient in long discussion  Treatment options discussed with patient in detail including risks and benefits. The risks and benefits of a knee arthroscopy were discussed with the patient.   The risks are including but not limited to: infection, injuries to blood vessels and nerves, non relief of symptoms, arthrofibrosis of knee, need for further operative intervention, blood loss, PE/DVT, MI and death. Patient verbalized understanding of the discussed procedure along with risks and benefits. The patient was counseled at length about the risks of jason Covid-19 during their perioperative period and any recovery window from their procedure. The patient was made aware that jason Covid-19  may worsen their prognosis for recovering from their procedure  and lend to a higher morbidity and/or mortality risk. All material risks, benefits, and reasonable alternatives including postponing the procedure were discussed. Of note, in review of patient's chart, it appears the patient did not complete physical therapy after initial ACL reconstruction. In a 15 minute assessment and discussion, patient was counseled on weight loss, healthy diet, and physical activity relating to this condition. He was educated with options in detail including nutrition, joining a health club/ weight loss program, and use of cardio equipment such as the Arc Trainer and the importance of use as well as range of motion and HEP exercises for weight loss and general health. Hiwot Parish DO           25 minutes was spent with patient. 50% or greater was spent counseling the patient.

## 2023-03-06 ENCOUNTER — INITIAL CONSULT (OUTPATIENT)
Dept: SURGERY | Age: 33
End: 2023-03-06
Payer: MEDICAID

## 2023-03-06 VITALS
HEIGHT: 68 IN | HEART RATE: 68 BPM | DIASTOLIC BLOOD PRESSURE: 79 MMHG | TEMPERATURE: 97.7 F | BODY MASS INDEX: 27.28 KG/M2 | WEIGHT: 180 LBS | RESPIRATION RATE: 18 BRPM | SYSTOLIC BLOOD PRESSURE: 140 MMHG

## 2023-03-06 DIAGNOSIS — D49.2 SOFT TISSUE NEOPLASM: Primary | ICD-10-CM

## 2023-03-06 PROCEDURE — G8419 CALC BMI OUT NRM PARAM NOF/U: HCPCS | Performed by: SURGERY

## 2023-03-06 PROCEDURE — 99203 OFFICE O/P NEW LOW 30 MIN: CPT | Performed by: SURGERY

## 2023-03-06 PROCEDURE — 4004F PT TOBACCO SCREEN RCVD TLK: CPT | Performed by: SURGERY

## 2023-03-06 PROCEDURE — G8428 CUR MEDS NOT DOCUMENT: HCPCS | Performed by: SURGERY

## 2023-03-06 PROCEDURE — G8484 FLU IMMUNIZE NO ADMIN: HCPCS | Performed by: SURGERY

## 2023-03-06 NOTE — PROGRESS NOTES
General Surgery History and Physical    Patient's Name/Date of Birth: Pamela Barragan / 1990    Date: March 6, 2023     Surgeon: Radha Quintana M.D.    PCP: Bernard Peng DO     Chief Complaint: soft tissue neoplasm of right groin    HPI:   Pamela Barragan is a 28 y.o. male who presents for evaluation of soft tissue neoplasm of right groin. It has become more painful and is enlarging. No past medical history on file. Past Surgical History:   Procedure Laterality Date    FRACTURE SURGERY      acl repair left    HERNIA REPAIR Bilateral     Bilateral ingunal / Lee Cheeks / Dr.D'Amico    KNEE SURGERY Left 07/19/2021    LEFT KNEE ARTHROSCOPY, ANTERIOR CRUCIATE LIGAMENT RECONSTRUCTION (ALLGRAFT), DEBRIDEMENT (ARTHREX) performed by Shilpa Canales DO at [address]       Current Outpatient Medications   Medication Sig Dispense Refill    ARIPiprazole (ABILIFY) 10 MG tablet       buprenorphine-naloxone (SUBOXONE) 8-2 MG FILM SL film        No current facility-administered medications for this visit. No Known Allergies    The patient has a family history that is negative for severe cardiovascular or respiratory issues, negative for reaction to anesthesia. 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     Sexual activity: Yes     Partners: Female   Other Topics Concern    Not on file   Social History Narrative    Not on file     Social Determinants of Health     Financial Resource Strain: Not on file   Food Insecurity: Not on file   Transportation Needs: Not on file   Physical Activity: Not on file   Stress: Not on file   Social Connections: Not on file   Intimate Partner Violence: Not on file   Housing Stability: Not on file           Review of Systems  Review of Systems -  General ROS: negative for - chills, fatigue or malaise  ENT ROS: negative for - hearing change, nasal congestion or nasal discharge  Allergy and Immunology ROS: negative for - hives, itchy/watery eyes or nasal congestion  Hematological and Lymphatic ROS: negative for - blood clots, blood transfusions, bruising or fatigue  Endocrine ROS: negative for - malaise/lethargy, mood swings, palpitations or polydipsia/polyuria  Breast ROS: negative for - new or changing breast lumps or nipple changes  Respiratory ROS: negative for - sputum changes, stridor, tachypnea or wheezing  Cardiovascular ROS: negative for - irregular heartbeat, loss of consciousness, murmur or orthopnea  Gastrointestinal ROS: negative for - constipation, diarrhea, gas/bloating, heartburn or hematemesis  Genito-Urinary ROS: negative for -  genital discharge, genital ulcers or hematuria  Musculoskeletal ROS: negative for - gait disturbance, muscle pain or muscular weakness      Physical exam:   BP (!) 140/79   Pulse 68   Temp 97.7 °F (36.5 °C) (Temporal)   Resp 18   Ht 5' 8\" (1.727 m)   Wt 180 lb (81.6 kg)   BMI 27.37 kg/m²   General appearance:  NAD  Head: NCAT, PERRLA, EOMI, red conjunctiva  Neck: supple, no masses  Lungs: CTAB, equal chest rise bilateral  Heart: Reg rate  Abdomen: soft, nontender, nondistended  Skin; soft tissue neoplasm of right groin that is soft rubbery and mobile.    Gu: no cva tenderness  Extremities: extremities normal, atraumatic, no cyanosis or edema  Pyschosocial: normal affect, no signs of depression      Assessment:  28 y.o. male with soft tissue neoplasm of right groin    Plan:  He was concerned this was a hernia so I reassured him and he does not want the cyst removed    Miranda Marks MD  3:13 PM  3/6/2023 Statement Selected

## (undated) DEVICE — SUPER TURBOVAC

## (undated) DEVICE — PADDING CAST W6INXL4YD COT LO LINTING WYTEX

## (undated) DEVICE — STRIP,CLOSURE,WOUND,MEDI-STRIP,1/2X4: Brand: MEDLINE

## (undated) DEVICE — NDL CNTR 40CT FM MAG: Brand: MEDLINE INDUSTRIES, INC.

## (undated) DEVICE — DOUBLE BASIN SET: Brand: MEDLINE INDUSTRIES, INC.

## (undated) DEVICE — SUTURE FIBERWIRE FIBERSTICK SZ 2-0 L50/12IN NONABSORBABLE AR7209

## (undated) DEVICE — Z INACTIVE USE 2660664 SOLUTION IRRIG 3000ML 0.9% SOD CHL USP UROMATIC PLAS CONT

## (undated) DEVICE — PACK,EXTREMITY,ORTHOMAX,5/CS: Brand: MEDLINE

## (undated) DEVICE — 3.75 MM INTEGRATED CABLE WAND ICW,                                    SUPER MULTIVAC 50 WITH INTEGRATED                                    FINGER SWITCHES IFS, 50 DEGREE: Brand: COBLATION

## (undated) DEVICE — SHEET,DRAPE,40X58,STERILE: Brand: MEDLINE

## (undated) DEVICE — TUBING PMP L16FT MAIN DISP FOR AR-6400 AR-6475

## (undated) DEVICE — NEEDLE SPNL L3.5IN PNK HUB S STL REG WALL FIT STYL W/ QNCKE

## (undated) DEVICE — 3M™ STERI-DRAPE™ U-DRAPE 1015: Brand: STERI-DRAPE™

## (undated) DEVICE — COVER,TABLE,60X90,STERILE: Brand: MEDLINE

## (undated) DEVICE — COVER,LIGHT HANDLE,FLX,1/PK: Brand: MEDLINE INDUSTRIES, INC.

## (undated) DEVICE — Z DISCONTINUED USE 2275686 GLOVE SURG SZ 8 L12IN FNGR THK13MIL WHT ISOLEX POLYISOPRENE

## (undated) DEVICE — [AGGRESSIVE PLUS CUTTER, ARTHROSCOPIC SHAVER BLADE,  DO NOT RESTERILIZE,  DO NOT USE IF PACKAGE IS DAMAGED,  KEEP DRY,  KEEP AWAY FROM SUNLIGHT]: Brand: FORMULA

## (undated) DEVICE — GAUZE,SPONGE,4"X4",16PLY,STRL,LF,10/TRAY: Brand: MEDLINE

## (undated) DEVICE — GAUZE,SPONGE,4"X4",16PLY,XRAY,STRL,LF: Brand: MEDLINE

## (undated) DEVICE — INTENDED FOR TISSUE SEPARATION, AND OTHER PROCEDURES THAT REQUIRE A SHARP SURGICAL BLADE TO PUNCTURE OR CUT.: Brand: BARD-PARKER ® STAINLESS STEEL BLADES

## (undated) DEVICE — PAD,ABDOMINAL,8"X10",ST,LF: Brand: MEDLINE

## (undated) DEVICE — SPONGE,LAP,12"X12",XR,ST,5/PK,40PK/CS: Brand: MEDLINE

## (undated) DEVICE — DRILL STRYKER REM-B

## (undated) DEVICE — CAMERA STRYKER 1488 HD GEN

## (undated) DEVICE — MARKER,SKIN,WI/RULER AND LABELS: Brand: MEDLINE

## (undated) DEVICE — OSTEOTOME PODI SM

## (undated) DEVICE — GOWN,SIRUS,POLYRNF,BRTHSLV,XLN/XL,20/CS: Brand: MEDLINE

## (undated) DEVICE — SYRINGE MED 10ML LUERLOCK TIP W/O SFTY DISP

## (undated) DEVICE — HYPODERMIC SAFETY NEEDLE: Brand: MAGELLAN

## (undated) DEVICE — ELECTRODE PT RET AD L9FT HI MOIST COND ADH HYDRGEL CORDED

## (undated) DEVICE — BANDAGE COMPR W6INXL12FT SMOOTH FOR LIMB EXSANG ESMARCH

## (undated) DEVICE — BANDAGE COMPR W6INXL5YD SELF ADH COHESIVE CO FLX

## (undated) DEVICE — APPLICATOR MEDICATED 26 CC SOLUTION HI LT ORNG CHLORAPREP

## (undated) DEVICE — TUBING, SUCTION, 1/4" X 10', STRAIGHT: Brand: MEDLINE

## (undated) DEVICE — TOWEL,OR,DSP,ST,BLUE,STD,6/PK,12PK/CS: Brand: MEDLINE

## (undated) DEVICE — SET MAJOR INSTR ORTHO

## (undated) DEVICE — YANKAUER,BULB TIP,W/O VENT,RIGID,STERILE: Brand: MEDLINE

## (undated) DEVICE — SYRINGE IRRIG 60ML SFT PLIABLE BLB EZ TO GRP 1 HND USE W/

## (undated) DEVICE — CANNULA ARTHSCP L3CM DIA12MM DBL DAM 1 PC MOLD LO PROF FLNG

## (undated) DEVICE — SUTURE TIGERSTICK TIGERWIRE SZ 2-0 L50IN NONABSORBABLE AR7209T

## (undated) DEVICE — PENCIL ES L3M BTTN SWCH HOLSTER W/ BLDE ELECTRD EDGE

## (undated) DEVICE — DRESSING GZ W1XL8IN COT XRFRM N ADH OVERWRAP CURAD

## (undated) DEVICE — SET SURG BASIN MAYO REUSABLE

## (undated) DEVICE — DRILL SURG FLIPCUTTER III

## (undated) DEVICE — PRECISION THIN (9.0 X 0.38 X 18.5MM)

## (undated) DEVICE — COVER,MAYO STAND,STERILE: Brand: MEDLINE